# Patient Record
Sex: MALE | Race: WHITE
[De-identification: names, ages, dates, MRNs, and addresses within clinical notes are randomized per-mention and may not be internally consistent; named-entity substitution may affect disease eponyms.]

---

## 2024-08-30 ENCOUNTER — TELEPHONE (OUTPATIENT)
Facility: HOSPITAL | Age: 84
End: 2024-08-30

## 2024-09-03 ENCOUNTER — HOSPITAL ENCOUNTER (OUTPATIENT)
Facility: HOSPITAL | Age: 84
Setting detail: RECURRING SERIES
Discharge: HOME | End: 2024-09-06
Payer: MEDICARE

## 2024-09-03 PROCEDURE — 97161 PT EVAL LOW COMPLEX 20 MIN: CPT

## 2024-09-03 NOTE — PROGRESS NOTES
PT DAILY TREATMENT NOTE/SHOULDER EVAL 10-18    Patient Name: Preston Nicole  Date:9/3/2024  : 1940  [x]  Patient  Verified  Payor: HU[employer]NA MEDICARE / Plan: HU[employer]NA GOLD PLUS HMO / Product Type: *No Product type* /    In time:1132  Out time:1212  Visit #: 1  16      Treatment Area: Pain in left shoulder [M25.512]    SUBJECTIVE  Pain Level (0-10 scale): 1-6  []constant [x]intermittent []improving []worsening []no change since onset    Any medication changes, allergies to medications, adverse drug reactions, diagnosis change, or new procedure performed?: [x] No    [] Yes (see summary sheet for update)  Subjective functional status/changes:     Patient has c/c of left shoulder pain since lifting a heavy backpack onto left shoulder on or about 3/1/2024 and feeling a painful pop left shoulder. Pain improved temporarily with steroid injections but is now referred to PT due to recurrence of symptoms with attempts to return to normal ADLs.  Patient describes pain as aching in deltoid region with intermittent sharp pain. Pain is worse in PM. Denies numbness/tingling. Denies popping/clicking. Aggravating factors: lifting above shoulder level, dressing upper body, shopping, laundry. Alleviating factors: rest in supported positions.  Denies red flags: SOB, chest pain, dizziness/lightheadedness, blurred/double vision, HA, chills/fevers, night sweats, change in bowel/bladder control, abdominal pain, difficulty swallowing, slurred speech, unexplained weight gain/loss, nausea, vomiting. PMHx: unremarkable. Surgical Hx: none. Social Hx: , two level home, social alcohol, no tobacco, retired. PLOF: avid swimmer, yoga, gym 2-3x/wk. CLOF: unable to swim or lift weights in gym, moderate difficulty with self care and household ADLs.  Diagnostic Imaging: probable left shoulder infraspinatus small tear, subacromial bursitis and atrophy of infraspinatus and teres. Recent falls Hx: 
[id number]  Phone: [phone]   Fax: [phone]

## 2024-09-04 ENCOUNTER — TELEPHONE (OUTPATIENT)
Facility: HOSPITAL | Age: 84
End: 2024-09-04

## 2024-09-04 NOTE — TELEPHONE ENCOUNTER
Called patient to offer earlier appts due to auth approval. Patient stated he is fine waiting until 23rd to begin therapy.

## 2024-09-23 ENCOUNTER — HOSPITAL ENCOUNTER (OUTPATIENT)
Facility: HOSPITAL | Age: 84
Setting detail: RECURRING SERIES
Discharge: HOME | End: 2024-09-26
Payer: MEDICARE

## 2024-09-23 PROCEDURE — 97112 NEUROMUSCULAR REEDUCATION: CPT

## 2024-09-23 PROCEDURE — 97110 THERAPEUTIC EXERCISES: CPT

## 2024-09-23 PROCEDURE — 97016 VASOPNEUMATIC DEVICE THERAPY: CPT

## 2024-09-23 PROCEDURE — 97530 THERAPEUTIC ACTIVITIES: CPT

## 2024-09-25 ENCOUNTER — HOSPITAL ENCOUNTER (OUTPATIENT)
Facility: HOSPITAL | Age: 84
Setting detail: RECURRING SERIES
Discharge: HOME | End: 2024-09-28
Payer: MEDICARE

## 2024-09-25 PROCEDURE — 97140 MANUAL THERAPY 1/> REGIONS: CPT

## 2024-09-25 PROCEDURE — 97110 THERAPEUTIC EXERCISES: CPT

## 2024-09-25 PROCEDURE — 97112 NEUROMUSCULAR REEDUCATION: CPT

## 2024-10-02 ENCOUNTER — HOSPITAL ENCOUNTER (OUTPATIENT)
Facility: HOSPITAL | Age: 84
Setting detail: RECURRING SERIES
Discharge: HOME | End: 2024-10-05
Payer: MEDICARE

## 2024-10-02 PROCEDURE — 97016 VASOPNEUMATIC DEVICE THERAPY: CPT

## 2024-10-02 PROCEDURE — 97530 THERAPEUTIC ACTIVITIES: CPT

## 2024-10-02 PROCEDURE — 97110 THERAPEUTIC EXERCISES: CPT

## 2024-10-02 PROCEDURE — 97112 NEUROMUSCULAR REEDUCATION: CPT

## 2024-10-02 NOTE — PROGRESS NOTES
[facility]  [address] [address]                                                                                      Phone: [phone]   Fax: [phone]    Progress Note  Patient name: Preston Nicole Start of Care: 9/3/2024   Referral source: Daniel Recio MD : 1940   Medical/Treatment Diagnosis: Pain in left shoulder [M25.512] Onset Date:3/1/2024     Prior Hospitalization: see medical history Provider#: [id number]   Medications: Verified on Patient Summary List    Comorbidities: None   Prior Level of Function:avid swimmer, yoga, gym 2-3x/wk     Certification Period: 9/3/2024 to 2024     Visits from Start of Care: 4    Missed Visits: 0    Updated Goals/Measure of Progress:     Short Term Goals: To be accomplished in 4 weeks:                 Patient will report compliance with initial/basic HEP at least 1x/day to aid in rehabilitation program.                 Status at IE: Patient instructed in and provided written copy of initial Home Exercise Program.                 Current: Pt reports daily HEP compliance MET 10/2/24                    Patient will display full left shoulder pain free AROM into flex 135, abd 140, ER 75 to aid in completion of ADLs.                 Status at IE: left shoulder flex 68, abd 42, ER 34                 Current: left 150 void of pain, abduction 80, ER 60 (up to 4/10 pain for Abduction/ER) Progressing 10/2/24     Long Term Goals: To be accomplished in 8 weeks:                 Patient will report compliance with comprehensive HEP a least 3-4x/week to aid in rehabilitation/strengthening program.                 Status at IE: Patient instructed in and provided written copy of initial Home Exercise Program.                 Current: Pt reports daily HEP compliance MET 10/2/24                    Patient will report no pain greater than 1-2/10 with overhead activities to aid in completion of ADLs.                 Status at 
significant improvements with left shoulder AROM and made positive progressions in left shoulder functional strength, however still not WFL. Pt also reports pain up to 4/10 when performing shoulder abd/ER MMT's. Pt is making good progress towards goals in POC and will benefit from skilled PT services to improve pain-free mobility to address remaining unmet goals.    Patient will continue to benefit from skilled PT / OT services to modify and progress therapeutic interventions, analyze and address functional mobility deficits, analyze and address ROM deficits, analyze and address strength deficits, analyze and address soft tissue restrictions, analyze and cue for proper movement patterns, analyze and modify for postural abnormalities, and analyze and address imbalance/dizziness to address functional deficits and attain remaining goals.    Progress toward goals / Updated goals:  []  See Progress Note/Recertification    Short Term Goals: To be accomplished in 4 weeks:                 Patient will report compliance with initial/basic HEP at least 1x/day to aid in rehabilitation program.                 Status at IE: Patient instructed in and provided written copy of initial Home Exercise Program.                 Current: Pt reports daily HEP compliance MET 10/2/24                    Patient will display full left shoulder pain free AROM into flex 135, abd 140, ER 75 to aid in completion of ADLs.                 Status at IE: left shoulder flex 68, abd 42, ER 34                 Current: left 150 void of pain, abduction 80, ER 60 (up to 4/10 pain for Abduction/ER) Progressing 10/2/24     Long Term Goals: To be accomplished in 8 weeks:                 Patient will report compliance with comprehensive HEP a least 3-4x/week to aid in rehabilitation/strengthening program.                 Status at IE: Patient instructed in and provided written copy of initial Home Exercise Program.                 Current: Pt reports daily

## 2024-10-04 ENCOUNTER — HOSPITAL ENCOUNTER (OUTPATIENT)
Facility: HOSPITAL | Age: 84
Setting detail: RECURRING SERIES
Discharge: HOME | End: 2024-10-07
Payer: MEDICARE

## 2024-10-04 PROCEDURE — 97112 NEUROMUSCULAR REEDUCATION: CPT

## 2024-10-04 PROCEDURE — 97110 THERAPEUTIC EXERCISES: CPT

## 2024-10-04 PROCEDURE — 97530 THERAPEUTIC ACTIVITIES: CPT

## 2024-10-04 PROCEDURE — 97016 VASOPNEUMATIC DEVICE THERAPY: CPT

## 2024-10-04 NOTE — PROGRESS NOTES
PHYSICAL / OCCUPATIONAL THERAPY - DAILY TREATMENT NOTE (updated )    Patient Name: Preston Nicole    Date: 10/4/2024    : 1940  Insurance: Payor: HUMANA MEDICARE / Plan: HU[employer]NA GOLD PLUS HMO / Product Type: *No Product type* /      Patient  verified Yes     Visit #   Current / Total 5 16   Time   In / Out 1330 1426   Pain   In / Out 0-1 0   Subjective Functional Status/Changes: \"I have no pain at rest or with light use, but still hurts if I try to use any weight with it.\"   Changes to:  Meds, Allergies, Med Hx, Sx Hx?  If yes, update Summary List no       TREATMENT AREA =  Pain in left shoulder [M25.512]    If an interpreting service is utilized for treatment of this patient, the contents of this document represent the material reviewed with the patient via the .     OBJECTIVE  Modalities Rationale:     decrease edema and decrease pain to improve patient's ability to Complete ADLS  10 min [x]  Vasopneumatic Device, press/temp: Medium, low      If using vaso (need to measure limb vaso being performed on)      pre-treatment girth : 42.4 cm.      post-treatment girth : 42.0 cm.      measured at (landmark location): AC joint      min []  Other:    [x] Skin assessment post-treatment (if applicable):    [x]  intact    []  redness- no adverse reaction     []redness - adverse reaction:        Therapeutic Procedures:  Tx Min Billable or 1:1 Min (if diff from Tx Min) Procedure, Rationale, Specifics    69520 Therapeutic Exercise (timed):  increase ROM, strength, coordination, balance, and proprioception to improve patient's ability to progress to PLOF and address remaining functional goals. (see flow sheet as applicable)     Details if applicable:       86 [id number] Therapeutic Activity (timed):  use of dynamic activities replicating functional movements to increase ROM, strength, coordination, balance, and proprioception in order to improve patient's ability to progress to PLOF and address remaining

## 2024-10-09 ENCOUNTER — HOSPITAL ENCOUNTER (OUTPATIENT)
Facility: HOSPITAL | Age: 84
Setting detail: RECURRING SERIES
Discharge: HOME | End: 2024-10-12
Payer: MEDICARE

## 2024-10-09 NOTE — PROGRESS NOTES
PHYSICAL / OCCUPATIONAL THERAPY - DAILY TREATMENT NOTE     Patient Name: Preston Nicole    Date: 10/9/2024    : 1940  Insurance: Payor: HU[employer]NA MEDICARE / Plan: HU[employer]NA GOLD PLUS HMO / Product Type: *No Product type* /      Patient  verified Yes     Visit #   Current / Total 6 16   Time   In / Out 13:29 14:23   Pain   In / Out 3 1-2   Subjective Functional Status/Changes: I did a lot of driving yesterday and my arm is still hurting some   Changes to:  Allergies, Med Hx, Sx Hx?   no       TREATMENT AREA =  Pain in left shoulder [M25.512]    If an interpreting service is utilized for treatment of this patient, the contents of this document represent the material reviewed with the patient via the .     OBJECTIVE    Modalities Rationale:     decrease edema and decrease pain to improve patient's ability to progress to PLOF and address remaining functional goals.     min [] Estim Unattended, type/location:                                      []  w/ice    []  w/heat    min [] Estim Attended, type/location:                                     []  w/US     []  w/ice    []  w/heat    []  TENS insruct      min []  Mechanical Traction: type/lbs                   []  pro   []  sup   []  int   []  cont    []  before manual    []  after manual    min []  Ultrasound, settings/location:      min []  Iontophoresis w/ dexamethasone, location:                                               []  take home patch       []  in clinic        min  unbilled []  Ice     []  Heat    location/position:     min []  Paraffin,  details:    10 min [x]  Vasopneumatic Device, press/temp: Medium, low    min []  Whirlpool / Fluido:    If using vaso (only need to measure limb vaso being performed on)      pre-treatment girth : 42.5 cm      post-treatment girth : 42.1 cm      measured at (landmark location) :  AC joint    min []  Other:    Skin assessment post-treatment (if applicable):    [x]  intact    []  redness- no adverse reaction

## 2024-10-11 ENCOUNTER — HOSPITAL ENCOUNTER (OUTPATIENT)
Facility: HOSPITAL | Age: 84
Setting detail: RECURRING SERIES
Discharge: HOME | End: 2024-10-14
Payer: MEDICARE

## 2024-10-11 PROCEDURE — 97112 NEUROMUSCULAR REEDUCATION: CPT

## 2024-10-11 PROCEDURE — 97110 THERAPEUTIC EXERCISES: CPT

## 2024-10-11 PROCEDURE — 97530 THERAPEUTIC ACTIVITIES: CPT

## 2024-10-11 NOTE — PROGRESS NOTES
PHYSICAL / OCCUPATIONAL THERAPY - DAILY TREATMENT NOTE (updated )    Patient Name: Preston Nicole    Date: 10/11/2024    : 1940  Insurance: Payor: HUMANA MEDICARE / Plan: HU[employer]NA GOLD PLUS HMO / Product Type: *No Product type* /      Patient  verified Yes     Visit #   Current / Total 7 16   Time   In / Out 1330 1424   Pain   In / Out 4 3   Subjective Functional Status/Changes: \"The left shoulder is still aggravated from the long distance driving I did three days ago. It is pretty sore today.\"   Changes to:  Meds, Allergies, Med Hx, Sx Hx?  If yes, update Summary List no       TREATMENT AREA =  Pain in left shoulder [M25.512]    If an interpreting service is utilized for treatment of this patient, the contents of this document represent the material reviewed with the patient via the .     OBJECTIVE  Modalities Rationale:     decrease edema and decrease pain to improve patient's ability to Complete ADLS         10 min [x]  Vasopneumatic Device, press/temp: Medium, low         If using vaso (need to measure limb vaso being performed on)      pre-treatment girth : 42.4 cm.      post-treatment girth : 42.0 cm.      measured at (landmark location): AC joint       min []  Other:     [x] Skin assessment post-treatment (if applicable):    [x]  intact    []  redness- no adverse reaction     []redness - adverse reaction:      Therapeutic Procedures:  Tx Min Billable or 1:1 Min (if diff from Tx Min) Procedure, Rationale, Specifics   89 [id number] Therapeutic Exercise (timed):  increase ROM, strength, coordination, balance, and proprioception to improve patient's ability to progress to PLOF and address remaining functional goals. (see flow sheet as applicable)     Details if applicable:       52 15291 Therapeutic Activity (timed):  use of dynamic activities replicating functional movements to increase ROM, strength, coordination, balance, and proprioception in order to improve patient's ability to progress

## 2024-10-16 ENCOUNTER — HOSPITAL ENCOUNTER (OUTPATIENT)
Facility: HOSPITAL | Age: 84
Setting detail: RECURRING SERIES
Discharge: HOME | End: 2024-10-19
Payer: MEDICARE

## 2024-10-16 PROCEDURE — 97110 THERAPEUTIC EXERCISES: CPT

## 2024-10-16 PROCEDURE — 97530 THERAPEUTIC ACTIVITIES: CPT

## 2024-10-16 PROCEDURE — 97112 NEUROMUSCULAR REEDUCATION: CPT

## 2024-10-16 NOTE — PROGRESS NOTES
Exercise Program.                 Current: Pt instructed in shoulder isometrics and provided written copy of additions to HEP    Progressing, 10/11/24                    Patient will display full left shoulder pain free AROM into flex 135, abd 140, ER 75 to aid in completion of ADLs.                 Status at IE: left shoulder flex 68, abd 42, ER 34                 Current: left 150 void of pain, abduction 80, ER 60 (up to 4/10 pain for Abduction/ER) Progressing 10/2/24      Long Term Goals: To be accomplished in 8 weeks:                 Patient will report compliance with comprehensive HEP a least 3-4x/week to aid in rehabilitation/strengthening program.                 Status at IE: Patient instructed in and provided written copy of initial Home Exercise Program.                 Current: Pt reports daily HEP compliance 10/16/24                     Patient will report no pain greater than 1-2/10 with overhead activities to aid in completion of ADLs.                 Status at IE: 1-6/10                 Current: 1-4/10 pain at worst Progressing 10/2/24                    Patient will increase left shoulder strength to 5/5 throughout all planes to aid in completion of ADLs.                 Status at IE: left shoulder flex 3-, abd 3-, ER 3-                 Current: Increased resistance with UE strengthening interventions to progress towards functional strength goals 10/9/24                    Patient will increase QuickDASH: Disability Arm, Shoulder, Hand score by MCID of 19% to demonstrate improvement in functional status.                  Status at IE: 54.5%                 Current: 50% Progressing 10/2/24    PLAN  Yes  Continue plan of care  []  Upgrade activities as tolerated  []  Discharge due to :  []  Other:    Sergio Vora PTA    10/16/2024    7:51 AM    Future Appointments   Date Time Provider Department Center   10/16/2024  1:30 PM Sergio Vora PTA [facility]   10/18/2024  1:30 PM Kory Sotelo PT [facility]

## 2024-10-18 ENCOUNTER — HOSPITAL ENCOUNTER (OUTPATIENT)
Facility: HOSPITAL | Age: 84
Setting detail: RECURRING SERIES
Discharge: HOME | End: 2024-10-21
Payer: MEDICARE

## 2024-10-18 PROCEDURE — 97110 THERAPEUTIC EXERCISES: CPT

## 2024-10-18 PROCEDURE — 97112 NEUROMUSCULAR REEDUCATION: CPT

## 2024-10-18 PROCEDURE — 97530 THERAPEUTIC ACTIVITIES: CPT

## 2024-10-18 PROCEDURE — 97016 VASOPNEUMATIC DEVICE THERAPY: CPT

## 2024-10-18 NOTE — PROGRESS NOTES
PHYSICAL / OCCUPATIONAL THERAPY - DAILY TREATMENT NOTE (updated )    Patient Name: Preston Nicole    Date: 10/18/2024    : 1940  Insurance: Payor: HUMANA MEDICARE / Plan: HU[employer]NA GOLD PLUS HMO / Product Type: *No Product type* /      Patient  verified Yes     Visit #   Current / Total 9 16   Time   In / Out 1330 1434   Pain   In / Out 6 4   Subjective Functional Status/Changes: \"I woke up this morning with real bad pain in the left shoulder. I didn't do anything different, so I am not sure why it is hurting so bad.\"   Changes to:  Meds, Allergies, Med Hx, Sx Hx?  If yes, update Summary List no       TREATMENT AREA =  Pain in left shoulder [M25.512]    If an interpreting service is utilized for treatment of this patient, the contents of this document represent the material reviewed with the patient via the .     OBJECTIVE  Modalities Rationale:     decrease edema and decrease pain to improve patient's ability to Complete ADLS         15 min [x]  Vasopneumatic Device, press/temp: Medium, low         If using vaso (need to measure limb vaso being performed on)      pre-treatment girth : 42.6 cm.      post-treatment girth : 42.1 cm.      measured at (landmark location): AC joint       min []  Other:     [x] Skin assessment post-treatment (if applicable):    [x]  intact    []  redness- no adverse reaction     []redness - adverse reaction:      Therapeutic Procedures:  Tx Min Billable or 1:1 Min (if diff from Tx Min) Procedure, Rationale, Specifics   21 15 [id number] Therapeutic Exercise (timed):  increase ROM, strength, coordination, balance, and proprioception to improve patient's ability to progress to PLOF and address remaining functional goals. (see flow sheet as applicable)     Details if applicable:       58 [id number] Therapeutic Activity (timed):  use of dynamic activities replicating functional movements to increase ROM, strength, coordination, balance, and proprioception in order to improve

## 2024-10-23 ENCOUNTER — HOSPITAL ENCOUNTER (OUTPATIENT)
Facility: HOSPITAL | Age: 84
Setting detail: RECURRING SERIES
Discharge: HOME | End: 2024-10-26
Payer: MEDICARE

## 2024-10-23 PROCEDURE — 97016 VASOPNEUMATIC DEVICE THERAPY: CPT

## 2024-10-23 PROCEDURE — 97110 THERAPEUTIC EXERCISES: CPT

## 2024-10-23 PROCEDURE — 97530 THERAPEUTIC ACTIVITIES: CPT

## 2024-10-23 PROCEDURE — 97112 NEUROMUSCULAR REEDUCATION: CPT

## 2024-10-23 NOTE — PROGRESS NOTES
PHYSICAL / OCCUPATIONAL THERAPY - DAILY TREATMENT NOTE     Patient Name: Preston Nicole    Date: 10/23/2024    : 1940  Insurance: Payor: HUMANA MEDICARE / Plan: HUMANA GOLD PLUS HMO / Product Type: *No Product type* /      Patient  verified Yes     Visit #   Current / Total 10 16   Time   In / Out 1:28 2:26   Pain   In / Out 4 3   Subjective Functional Status/Changes: Pt states that his pain levels are back to normal today   Changes to:  Allergies, Med Hx, Sx Hx?   no       TREATMENT AREA =  Pain in left shoulder [M25.512]    If an interpreting service is utilized for treatment of this patient, the contents of this document represent the material reviewed with the patient via the .     OBJECTIVE    Modalities Rationale:     decrease edema and decrease pain to improve patient's ability to progress to PLOF and address remaining functional goals.     min [] Estim Unattended, type/location:                                      []  w/ice    []  w/heat    min [] Estim Attended, type/location:                                     []  w/US     []  w/ice    []  w/heat    []  TENS insruct      min []  Mechanical Traction: type/lbs                   []  pro   []  sup   []  int   []  cont    []  before manual    []  after manual    min []  Ultrasound, settings/location:      min []  Iontophoresis w/ dexamethasone, location:                                               []  take home patch       []  in clinic        min  unbilled []  Ice     []  Heat    location/position:     min []  Paraffin,  details:    15 min []  Vasopneumatic Device, press/temp: Medium/34 degrees    min []  Whirlpool / Fluido:    If using vaso (only need to measure limb vaso being performed on)      pre-treatment girth : 45 cm      post-treatment girth : 44.6 cm      measured at (landmark location) :  AC joint    min []  Other:    Skin assessment post-treatment (if applicable):    [x]  intact    []  redness- no adverse reaction

## 2024-10-25 ENCOUNTER — HOSPITAL ENCOUNTER (OUTPATIENT)
Facility: HOSPITAL | Age: 84
Setting detail: RECURRING SERIES
Discharge: HOME | End: 2024-10-28
Payer: MEDICARE

## 2024-10-25 PROCEDURE — 97112 NEUROMUSCULAR REEDUCATION: CPT

## 2024-10-25 PROCEDURE — 97530 THERAPEUTIC ACTIVITIES: CPT

## 2024-10-25 PROCEDURE — 97110 THERAPEUTIC EXERCISES: CPT

## 2024-10-25 NOTE — PROGRESS NOTES
PHYSICAL / OCCUPATIONAL THERAPY - DAILY TREATMENT NOTE (updated )    Patient Name: Preston Nicole    Date: 10/25/2024    : 1940  Insurance: Payor: HUMANA MEDICARE / Plan: HU[employer]NA GOLD PLUS HMO / Product Type: *No Product type* /      Patient  verified Yes     Visit #   Current / Total 11 16   Time   In / Out 1329 1409   Pain   In / Out 4 4   Subjective Functional Status/Changes: \"The shoulder just seems to be at a new norm of being kind of sore all the time.\"   Changes to:  Meds, Allergies, Med Hx, Sx Hx?  If yes, update Summary List no       TREATMENT AREA =  Pain in left shoulder [M25.512]    If an interpreting service is utilized for treatment of this patient, the contents of this document represent the material reviewed with the patient via the .     OBJECTIVE    Therapeutic Procedures:  Tx Min Billable or 1:1 Min (if diff from Tx Min) Procedure, Rationale, Specifics   15  39809 Therapeutic Exercise (timed):  increase ROM, strength, coordination, balance, and proprioception to improve patient's ability to progress to PLOF and address remaining functional goals. (see flow sheet as applicable)     Details if applicable:       15  47307 Therapeutic Activity (timed):  use of dynamic activities replicating functional movements to increase ROM, strength, coordination, balance, and proprioception in order to improve patient's ability to progress to PLOF and address remaining functional goals.  (see flow sheet as applicable)     Details if applicable:     10  54439 Neuromuscular Re-Education (timed):  improve balance, coordination, kinesthetic sense, posture, core stability and proprioception to improve patient's ability to develop conscious control of individual muscles and awareness of position of extremities in order to progress to PLOF and address remaining functional goals. (see flow sheet as applicable)     Details if applicable:     40  MC BC Totals Reminder: bill using total billable min

## 2024-10-25 NOTE — PROGRESS NOTES
[facility]  [address] [address]  Phone: [phone]   Fax: [phone]    Discharge Summary    Patient name: Preston Nicole Start of Care: 9/3/2024   Referral source: Daniel Recio MD : 1940   Medical/Treatment Diagnosis: Pain in left shoulder [M25.512] Onset Date:3/1/2024      Prior Hospitalization: see medical history Provider#: [id number]   Medications: Verified on Patient Summary List     Comorbidities: None   Prior Level of Function:avid swimmer, yoga, gym 2-3x/wk      Certification Period: 9/3/2024 to 2024      Visits from Start of Care: 11                                     Missed Visits: 1    Goals/Measure of Progress:  Short Term Goals: To be accomplished in 4 weeks:                 Patient will report compliance with initial/basic HEP at least 1x/day to aid in rehabilitation program.                 Status at IE: Patient instructed in and provided written copy of initial Home Exercise Program.                 Current: Pt instructed in shoulder isometrics and provided written copy of additions to HEP    Progressing, 10/11/24                    Patient will display full left shoulder pain free AROM into flex 135, abd 140, ER 75 to aid in completion of ADLs.                 Status at IE: left shoulder flex 68, abd 42, ER 34                 Current: left 150 void of pain, abduction 80, ER 60 (up to 4/10 pain for Abduction/ER) Progressing 10/2/24               Current: left shoulder abduction 70 degrees, ER 60 degrees with pain noted at end range. 10/23/24 slight regression.     Long Term Goals: To be accomplished in 8 weeks:                 Patient will report compliance with comprehensive HEP a least 3-4x/week to aid in rehabilitation/strengthening program.                 Status at IE: Patient instructed in and provided written copy of initial Home Exercise Program.                 Current: Pt reports continued daily compliance to HEP

## 2024-10-25 NOTE — PROGRESS NOTES
Physical Therapy Discharge Instructions    [facility]  [address] [address]  Phone: [phone]   Fax: [phone]      Patient: Preston Nicole  : 1940    Continue Home Exercise Program 2 times per day for 6 weeks, then decrease to 4-5 times per week      Continue with    [x] Ice  as needed      [] Heat           Follow up with MD:     [x] Upon completion of therapy     [] As needed      Recommendations:     []   Return to activity with home program    [x]   Return to activity with the following modifications:       []Post Rehab Program    []Join Independent aquatic program     [x]Return to/join local gym      Additional Comments: Please contact clinic at above phone number if you have any questions regarding Home Exercise Program or self care instructions.

## 2024-10-30 ENCOUNTER — TELEPHONE (OUTPATIENT)
Facility: HOSPITAL | Age: 84
End: 2024-10-30

## 2024-10-30 NOTE — TELEPHONE ENCOUNTER
Per patient, he had a follow-up with Ortho. He will be having rotator cuff surgery in 3wks and wanted to update the therapist.

## 2024-11-04 ENCOUNTER — HOSPITAL ENCOUNTER (OUTPATIENT)
Facility: HOSPITAL | Age: 84
Discharge: HOME | End: 2024-11-06

## 2024-11-04 ENCOUNTER — HOSPITAL ENCOUNTER (OUTPATIENT)
Facility: HOSPITAL | Age: 84
Discharge: HOME | End: 2024-11-07

## 2024-11-04 ENCOUNTER — TRANSCRIBE ORDERS (OUTPATIENT)
Facility: HOSPITAL | Age: 84
End: 2024-11-04

## 2024-11-04 ENCOUNTER — HOSPITAL ENCOUNTER (OUTPATIENT)
Facility: HOSPITAL | Age: 84
Discharge: HOME | End: 2024-11-07
Payer: MEDICARE

## 2024-11-04 DIAGNOSIS — M75.42 IMPINGEMENT SYNDROME OF LEFT SHOULDER: ICD-10-CM

## 2024-11-04 DIAGNOSIS — Z01.812 BLOOD TESTS PRIOR TO TREATMENT OR PROCEDURE: ICD-10-CM

## 2024-11-04 DIAGNOSIS — M75.102 TEAR OF LEFT ROTATOR CUFF, UNSPECIFIED TEAR EXTENT, UNSPECIFIED WHETHER TRAUMATIC: ICD-10-CM

## 2024-11-04 DIAGNOSIS — M75.102 TEAR OF LEFT ROTATOR CUFF, UNSPECIFIED TEAR EXTENT, UNSPECIFIED WHETHER TRAUMATIC: Primary | ICD-10-CM

## 2024-11-04 LAB
ALBUMIN SERPL-MCNC: 3.7 G/DL (ref 3.4–5)
ALBUMIN/GLOB SERPL: 1.2 (ref 0.8–1.7)
ALP SERPL-CCNC: 71 U/L (ref 45–117)
ALT SERPL-CCNC: 21 U/L (ref 16–61)
ANION GAP SERPL CALC-SCNC: 3 MMOL/L (ref 3–18)
AST SERPL-CCNC: 16 U/L (ref 10–38)
BASOPHILS # BLD: 0 K/UL (ref 0–0.1)
BASOPHILS NFR BLD: 0 % (ref 0–2)
BILIRUB SERPL-MCNC: 0.6 MG/DL (ref 0.2–1)
BUN SERPL-MCNC: 26 MG/DL (ref 7–18)
BUN/CREAT SERPL: 21 (ref 12–20)
CALCIUM SERPL-MCNC: 9.3 MG/DL (ref 8.5–10.1)
CHLORIDE SERPL-SCNC: 106 MMOL/L (ref 100–111)
CO2 SERPL-SCNC: 31 MMOL/L (ref 21–32)
CREAT SERPL-MCNC: 1.25 MG/DL (ref 0.6–1.3)
DIFFERENTIAL METHOD BLD: ABNORMAL
EOSINOPHIL # BLD: 0.3 K/UL (ref 0–0.4)
EOSINOPHIL NFR BLD: 4 % (ref 0–5)
ERYTHROCYTE [DISTWIDTH] IN BLOOD BY AUTOMATED COUNT: 12.4 % (ref 11.6–14.5)
FAX TO NUMBER: NORMAL
GLOBULIN SER CALC-MCNC: 3 G/DL (ref 2–4)
GLUCOSE SERPL-MCNC: 100 MG/DL (ref 74–99)
HCT VFR BLD AUTO: 44.5 % (ref 36–48)
HGB BLD-MCNC: 14.5 G/DL (ref 13–16)
IMM GRANULOCYTES # BLD AUTO: 0 K/UL (ref 0–0.04)
IMM GRANULOCYTES NFR BLD AUTO: 0 % (ref 0–0.5)
LYMPHOCYTES # BLD: 1.2 K/UL (ref 0.9–3.6)
LYMPHOCYTES NFR BLD: 16 % (ref 21–52)
MCH RBC QN AUTO: 32.3 PG (ref 24–34)
MCHC RBC AUTO-ENTMCNC: 32.6 G/DL (ref 31–37)
MCV RBC AUTO: 99.1 FL (ref 78–100)
MONOCYTES # BLD: 0.8 K/UL (ref 0.05–1.2)
MONOCYTES NFR BLD: 11 % (ref 3–10)
NEUTS SEG # BLD: 5.1 K/UL (ref 1.8–8)
NEUTS SEG NFR BLD: 69 % (ref 40–73)
NRBC # BLD: 0 K/UL (ref 0–0.01)
NRBC BLD-RTO: 0 PER 100 WBC
PLATELET # BLD AUTO: 294 K/UL (ref 135–420)
PMV BLD AUTO: 9 FL (ref 9.2–11.8)
POTASSIUM SERPL-SCNC: 5.2 MMOL/L (ref 3.5–5.5)
PROT SERPL-MCNC: 6.7 G/DL (ref 6.4–8.2)
RBC # BLD AUTO: 4.49 M/UL (ref 4.35–5.65)
SODIUM SERPL-SCNC: 140 MMOL/L (ref 136–145)
TEST RESULTS FAXED TO: NORMAL
WBC # BLD AUTO: 7.4 K/UL (ref 4.6–13.2)

## 2024-11-04 PROCEDURE — 85025 COMPLETE CBC W/AUTO DIFF WBC: CPT

## 2024-11-04 PROCEDURE — 36415 COLL VENOUS BLD VENIPUNCTURE: CPT

## 2024-11-04 PROCEDURE — 93005 ELECTROCARDIOGRAM TRACING: CPT

## 2024-11-04 PROCEDURE — 80053 COMPREHEN METABOLIC PANEL: CPT

## 2024-11-05 LAB
EKG ATRIAL RATE: 59 BPM
EKG DIAGNOSIS: NORMAL
EKG P AXIS: 17 DEGREES
EKG P-R INTERVAL: 194 MS
EKG Q-T INTERVAL: 402 MS
EKG QRS DURATION: 104 MS
EKG QTC CALCULATION (BAZETT): 397 MS
EKG R AXIS: -45 DEGREES
EKG T AXIS: 54 DEGREES
EKG VENTRICULAR RATE: 59 BPM

## 2025-01-17 ENCOUNTER — HOSPITAL ENCOUNTER (OUTPATIENT)
Facility: HOSPITAL | Age: 85
Setting detail: RECURRING SERIES
Discharge: HOME | End: 2025-01-20
Payer: MEDICARE

## 2025-01-17 PROCEDURE — 97162 PT EVAL MOD COMPLEX 30 MIN: CPT

## 2025-01-17 NOTE — PROGRESS NOTES
PT DAILY TREATMENT NOTE/SHOULDER EVAL     Patient Name: Preston Nicole    Date: 2025    : 1940  Insurance: Payor: HU[employer]NA MEDICARE / Plan: HU[employer] GOLD PLUS HMO / Product Type: *No Product type* /      Patient  verified yes     Visit #   Current / Total 1 24   Time   In / Out 10:00am 10:38am   Pain   In / Out 0 0   Subjective Functional Status/Changes: See below       Treatment Area: Left shoulder pain [M25.512]    If an interpreting service is utilized for treatment of this patient, the contents of this document represent the material reviewed with the patient via the .       SUBJECTIVE  Pain Level (0-10 scale): see above  []constant [x]intermittent []improving []worsening []no change since onset    Any medication changes, allergies to medications, adverse drug reactions, diagnosis change, or new procedure performed?: [x] No    [] Yes (see summary sheet for update)  Subjective functional status/changes:     PLOF: functionally independent, no AD, exercise = yoga/hiking/swimming  Mechanism of Injury: 24 large rotor cuff tear surgery; first day at  without sling per MD   Current symptoms/Complaints:   - right handed  - denied radicular symptoms  - pain with reaching in all directions    PMHx/Surgical Hx: cataract surgery  Work Hx: retired   Living Situation: lives with wife  Pt Goals: return to PLOF and regular exercise routine   Barriers: []pain []financial []time []transportation []other  Substance use: []Alcohol []Tobacco []other:   FABQ Score: []low []elevate  Cognition: A & O x 4    Other:    OBJECTIVE/EXAMINATION    24 min [x]Eval                  []Re-Eval       Therapeutic Procedures:  Tx Min Billable or 1:1 Min (if diff from Tx Min) Procedure, Rationale, Specifics   7  09779 Therapeutic Exercise (timed):  increase ROM, strength, coordination, balance, and proprioception to improve patient's ability to progress to PLOF and address remaining functional goals. (see flow sheet

## 2025-01-17 NOTE — PROGRESS NOTES
[facility] at the [address], [address], [place] [address]   Phone: [phone]     Fax: [phone]       Plan of Care/ Statement of Necessity for Physical Therapy Services    Patient name: Preston Nicole Start of Care: 2025   Referral source: Ahsan Desir PA-C : 1940    Medical Diagnosis: Left shoulder pain [M25.512]       Onset Date:s/p 24    Treatment Diagnosis: M25.512  LEFT SHOULDER PAIN                            Prior Hospitalization: see medical history Provider#: [id number]     Comorbidities: cataract surgery     Prior Level of Function: functionally independent, no AD, exercise = yoga/hiking/swimming     If an interpreting service is utilized for treatment of this patient, the contents of this document represent the material reviewed with the patient via the .       The Plan of Care and following information is based on the information from the initial evaluation.  Assessment/ key information: Pt is a right handed 85 yo male presenting to therapy with c/c left shoulder pain s/p large RCR surgery on 24. He reports recent follow up with MD anni pean, who also D/C him from the slight starting at today's visit/IE. Pain increases to 8/10 with reaching left arm in any direction impacting ADLs/IADls (no AROM per this part of the protocol making this normal). Pain decreases to 0-1/10 with rest. Pt demonstrates limitations in ROM and functional strength per  dynamometer. Will progress conservatively per standard large RCR protocol. Pt would benefit from skilled PT services to address the above impairments to allow pt to complete ADLs with increased ease and less pain.       Evaluation Complexity:  History:  MEDIUM  Complexity : 1-2 comorbidities / personal factors will impact the outcome/ POC ; Examination:  MEDIUM Complexity : 3 Standardized tests and measures addressin body structure, function, activity limitation

## 2025-01-20 ENCOUNTER — HOSPITAL ENCOUNTER (OUTPATIENT)
Facility: HOSPITAL | Age: 85
Setting detail: RECURRING SERIES
Discharge: HOME | End: 2025-01-23
Payer: MEDICARE

## 2025-01-20 PROCEDURE — 97110 THERAPEUTIC EXERCISES: CPT

## 2025-01-20 PROCEDURE — 97140 MANUAL THERAPY 1/> REGIONS: CPT

## 2025-01-20 PROCEDURE — 97530 THERAPEUTIC ACTIVITIES: CPT

## 2025-01-20 PROCEDURE — 97016 VASOPNEUMATIC DEVICE THERAPY: CPT

## 2025-01-20 NOTE — PROGRESS NOTES
PHYSICAL / OCCUPATIONAL THERAPY - DAILY TREATMENT NOTE     Patient Name: Preston Nicole    Date: 2025    : 1940  Insurance: Payor: HU[employer]NA MEDICARE / Plan: HU[employer] GOLD PLUS HMO / Product Type: *No Product type* /      Patient  verified Yes     Visit #   Current / Total 2 24   Time   In / Out 10:40am 11:29   Pain   In / Out 3-4 0   Subjective Functional Status/Changes: Pt reports AAROM chest press caused left shoulder pain == pt deferred this HEP for 1-2 weeks    Changes to:  Allergies, Med Hx, Sx Hx?   no       TREATMENT AREA =  Left shoulder pain [M25.512]    If an interpreting service is utilized for treatment of this patient, the contents of this document represent the material reviewed with the patient via the .     OBJECTIVE    Modalities Rationale:     decrease edema, decrease inflammation, and decrease pain to improve patient's ability to progress to PLOF and address remaining functional goals.     min [] Estim Unattended, type/location:                                      []  w/ice    []  w/heat    min [] Estim Attended, type/location:                                     []  w/US     []  w/ice    []  w/heat    []  TENS insruct      min []  Mechanical Traction: type/lbs                   []  pro   []  sup   []  int   []  cont    []  before manual    []  after manual    min []  Ultrasound, settings/location:      min []  Iontophoresis w/ dexamethasone, location:                                               []  take home patch       []  in clinic     4   min  unbilled []  Ice     [x]  Heat    location/position: Prior to treatment - left UE/shoulder    min []  Paraffin,  details:    10 min [x]  Vasopneumatic Device, press/temp: Left shoulder - low compression     min []  Whirlpool / Fluido:    If using vaso (only need to measure limb vaso being performed on)      pre-treatment girth : 47cm       post-treatment girth : 47 cm       measured at (landmark location) :  mid axillary     min

## 2025-01-23 ENCOUNTER — HOSPITAL ENCOUNTER (OUTPATIENT)
Facility: HOSPITAL | Age: 85
Setting detail: RECURRING SERIES
Discharge: HOME | End: 2025-01-26
Payer: MEDICARE

## 2025-01-23 PROCEDURE — 97016 VASOPNEUMATIC DEVICE THERAPY: CPT

## 2025-01-23 PROCEDURE — 97140 MANUAL THERAPY 1/> REGIONS: CPT

## 2025-01-23 PROCEDURE — 97530 THERAPEUTIC ACTIVITIES: CPT

## 2025-01-23 PROCEDURE — 97110 THERAPEUTIC EXERCISES: CPT

## 2025-01-23 NOTE — PROGRESS NOTES
PHYSICAL / OCCUPATIONAL THERAPY - DAILY TREATMENT NOTE     Patient Name: Preston Nicole    Date: 2025    : 1940  Insurance: Payor: HUMANA MEDICARE / Plan: HU[employer]NA GOLD PLUS HMO / Product Type: *No Product type* /      Patient  verified Yes     Visit #   Current / Total 3 24   Time   In / Out 1438 1534   Pain   In / Out 2/10 <110   Subjective Functional Status/Changes: Patient states that he woke up this morning with a 2/10 pain in the left shoulder that has not gone away all day. It did not get any better or worse with completion of HEP this morning.    Changes to:  Allergies, Med Hx, Sx Hx?   no       TREATMENT AREA =  Left shoulder pain [M25.512]    If an interpreting service is utilized for treatment of this patient, the contents of this document represent the material reviewed with the patient via the .     OBJECTIVE    Modalities Rationale:     decrease pain and increase tissue extensibility to improve patient's ability to progress to PLOF and address remaining functional goals.     min [] Estim Unattended, type/location:                                      []  w/ice    []  w/heat    min [] Estim Attended, type/location:                                     []  w/US     []  w/ice    []  w/heat    []  TENS insruct      min []  Mechanical Traction: type/lbs                   []  pro   []  sup   []  int   []  cont    []  before manual    []  after manual    min []  Ultrasound, settings/location:      min []  Iontophoresis w/ dexamethasone, location:                                               []  take home patch       []  in clinic     5   min  unbilled []  Ice     [x]  Heat    location/position: Left shoulder cervical pack pre-workout    min []  Paraffin,  details:    10 min []  Vasopneumatic Device, press/temp: Low pressure, 34 deg F, left shoulder in seated    min []  Whirlpool / Fluido:    If using vaso (only need to measure limb vaso being performed on)      pre-treatment girth : 49

## 2025-01-28 ENCOUNTER — HOSPITAL ENCOUNTER (OUTPATIENT)
Facility: HOSPITAL | Age: 85
Setting detail: RECURRING SERIES
Discharge: HOME | End: 2025-01-31
Payer: MEDICARE

## 2025-01-28 PROCEDURE — 97140 MANUAL THERAPY 1/> REGIONS: CPT

## 2025-01-28 PROCEDURE — 97530 THERAPEUTIC ACTIVITIES: CPT

## 2025-01-28 PROCEDURE — 97110 THERAPEUTIC EXERCISES: CPT

## 2025-01-28 NOTE — PROGRESS NOTES
2.  Pt will increase left shoulder PROM to 160 deg flexion/ABD, and >60 deg ER/IR to improve mobility for ADLs.  Eval:                                           AROM                                                              PROM    Left Right   Left Right   Flexion  Flexion 92 165   Scaption/ABD  Scaptin/ABD 74 147   ER @ 0 Degrees NT NT ER @ 0 Degrees 20 NT   ER @ 90 Degrees NT 90 ER @ 90 Degrees NT 90   IR @ 90 Degrees NT 40 IR  Degrees @0 = 60 @ 90 = 45      Current (01/28/2025): abduction: 86 degrees PROM, flexion: 114 degrees PROM     Long Term Goals: LTG- To be accomplished in 12 week(s):  1.  Pt will report a worst pain of 3/10 to improve functional activity tolerance for a return to regular exercise program.  Eval:worst pain = 8/10      2.  Pt will increase left UE strength via  dynamometer by 5-10# to improve functional UE strength required for lifting/carrying items.  Eval: Strength : right = 83#, left = 70#      3.  Pt will increase left shoulder AROM to 160 deg flexion/ABD, and >60 deg ER/IR to improve mobility for IADLs.  Eval:                                           AROM                                                              PROM    Left Right   Left Right   Flexion  Flexion 92 165   Scaption/ABD  Scaptin/ABD 74 147   ER @ 0 Degrees NT NT ER @ 0 Degrees 20 NT   ER @ 90 Degrees NT 90 ER @ 90 Degrees NT 90   IR @ 90 Degrees NT 40 IR  Degrees @0 = 60 @ 90 = 45         4.  Pt Quick Dash score will improvement of 15 points to show improvement in functional activity tolerance and improve QOL.   Eval:Quick Dash = 36.4  Current: will address at visit 5    PLAN  Yes  Continue plan of care  []  Upgrade activities as tolerated  []  Discharge due to :  []  Other:    Teresa Camara PT    1/28/2025    1:37 PM    Future Appointments   Date Time Provider Department Center   1/31/2025  2:00 PM Teresa Camara PT [facility]   2/4/2025  8:40 AM Teresa Camara PT [facility]

## 2025-01-31 ENCOUNTER — HOSPITAL ENCOUNTER (OUTPATIENT)
Facility: HOSPITAL | Age: 85
Setting detail: RECURRING SERIES
End: 2025-01-31
Payer: MEDICARE

## 2025-01-31 PROCEDURE — 97110 THERAPEUTIC EXERCISES: CPT

## 2025-01-31 PROCEDURE — 97530 THERAPEUTIC ACTIVITIES: CPT

## 2025-01-31 PROCEDURE — 97140 MANUAL THERAPY 1/> REGIONS: CPT

## 2025-01-31 NOTE — PROGRESS NOTES
PHYSICAL / OCCUPATIONAL THERAPY - DAILY TREATMENT NOTE     Patient Name: Preston Nicole    Date: 2025    : 1940  Insurance: Payor: HU[employer]NA MEDICARE / Plan: HU[employer] GOLD PLUS HMO / Product Type: *No Product type* /      Patient  verified Yes     Visit #   Current / Total 5 24   Time   In / Out 1405 1445   Pain   In / Out 1/10 1/10   Subjective Functional Status/Changes: Patient reports that his shoulder is only minimally painful this afternoon.    Changes to:  Allergies, Med Hx, Sx Hx?   no       TREATMENT AREA =  Left shoulder pain [M25.512]    If an interpreting service is utilized for treatment of this patient, the contents of this document represent the material reviewed with the patient via the .     OBJECTIVE    Therapeutic Procedures:  Tx Min Billable or 1:1 Min (if diff from Tx Min) Procedure, Rationale, Specifics   20  58084 Therapeutic Exercise (timed):  increase ROM, strength, coordination, balance, and proprioception to improve patient's ability to progress to PLOF and address remaining functional goals. (see flow sheet as applicable)    Details if applicable:       12  50099 Therapeutic Activity (timed):  use of dynamic activities replicating functional movements to increase ROM, strength, coordination, balance, and proprioception in order to improve patient's ability to progress to PLOF and address remaining functional goals.  (see flow sheet as applicable)    Details if applicable:     8  65092 Manual Therapy (timed):  decrease pain, increase ROM, and increase tissue extensibility to improve patient's ability to progress to PLOF and address remaining functional goals.  The manual therapy interventions were performed at a separate and distinct time from the therapeutic activities interventions . Details: PROM of left shoulder into flexion, abduction, and ER in supine      Details if applicable:     40  [facility] BC Totals Reminder: bill using total billable min of TIMED therapeutic

## 2025-02-04 ENCOUNTER — HOSPITAL ENCOUNTER (OUTPATIENT)
Facility: HOSPITAL | Age: 85
Setting detail: RECURRING SERIES
Discharge: HOME | End: 2025-02-07
Payer: MEDICARE

## 2025-02-04 PROCEDURE — 97110 THERAPEUTIC EXERCISES: CPT

## 2025-02-04 PROCEDURE — 97140 MANUAL THERAPY 1/> REGIONS: CPT

## 2025-02-04 PROCEDURE — 97530 THERAPEUTIC ACTIVITIES: CPT

## 2025-02-04 PROCEDURE — 97016 VASOPNEUMATIC DEVICE THERAPY: CPT

## 2025-02-04 NOTE — PROGRESS NOTES
PHYSICAL / OCCUPATIONAL THERAPY - DAILY TREATMENT NOTE     Patient Name: Preston Nicole    Date: 2025    : 1940  Insurance: Payor: HUMANA MEDICARE / Plan: HU[employer]NA GOLD PLUS HMO / Product Type: *No Product type* /      Patient  verified Yes     Visit #   Current / Total 6 24   Time   In / Out 8:40am 9:33am   Pain   In / Out 1 1   Subjective Functional Status/Changes: The pt reports he is feeling well    Changes to:  Allergies, Med Hx, Sx Hx?   no       TREATMENT AREA =  Left shoulder pain [M25.512]    If an interpreting service is utilized for treatment of this patient, the contents of this document represent the material reviewed with the patient via the .     OBJECTIVE    Modalities Rationale:     decrease edema, decrease inflammation, and decrease pain to improve patient's ability to progress to PLOF and address remaining functional goals.     min [] Estim Unattended, type/location:                                      []  w/ice    []  w/heat    min [] Estim Attended, type/location:                                     []  w/US     []  w/ice    []  w/heat    []  TENS insruct      min []  Mechanical Traction: type/lbs                   []  pro   []  sup   []  int   []  cont    []  before manual    []  after manual    min []  Ultrasound, settings/location:      min []  Iontophoresis w/ dexamethasone, location:                                               []  take home patch       []  in clinic        min  unbilled []  Ice     []  Heat    location/position:     min []  Paraffin,  details:    10 min [x]  Vasopneumatic Device, press/temp: Seated - left shoulder ; low compression     min []  Whirlpool / Fluido:    If using vaso (only need to measure limb vaso being performed on)      pre-treatment girth : 46cm       post-treatment girth : 46cm       measured at (landmark location) :  mid axillary     min []  Other:    Skin assessment post-treatment (if applicable):    []  intact    []  redness- no

## 2025-02-07 ENCOUNTER — HOSPITAL ENCOUNTER (OUTPATIENT)
Facility: HOSPITAL | Age: 85
Setting detail: RECURRING SERIES
Discharge: HOME | End: 2025-02-10
Payer: MEDICARE

## 2025-02-07 PROCEDURE — 97140 MANUAL THERAPY 1/> REGIONS: CPT

## 2025-02-07 PROCEDURE — 97112 NEUROMUSCULAR REEDUCATION: CPT

## 2025-02-07 NOTE — PROGRESS NOTES
PHYSICAL / OCCUPATIONAL THERAPY - DAILY TREATMENT NOTE     Patient Name: Preston Nicole    Date: 2025    : 1940  Insurance: Payor: HUMANA MEDICARE / Plan: HU[employer]NA GOLD PLUS HMO / Product Type: *No Product type* /      Patient  verified Yes     Visit #   Current / Total 7 24   Time   In / Out 0923 1012   Pain   In / Out 1/10 1/10   Subjective Functional Status/Changes: Patient reports that he's feeling good this morning.    Changes to:  Allergies, Med Hx, Sx Hx?   no       TREATMENT AREA =  Left shoulder pain [M25.512]    If an interpreting service is utilized for treatment of this patient, the contents of this document represent the material reviewed with the patient via the .     OBJECTIVE    Therapeutic Procedures:  Tx Min Billable or 1:1 Min (if diff from Tx Min) Procedure, Rationale, Specifics   22 14 70182 Neuromuscular Re-Education (timed):  improve balance, coordination, kinesthetic sense, posture, core stability and proprioception to improve patient's ability to develop conscious control of individual muscles and awareness of position of extremities in order to progress to PLOF and address remaining functional goals. (see flow sheet as applicable)    Details if applicable:       11  90524 Manual Therapy (timed):  decrease pain, increase ROM, and increase tissue extensibility to improve patient's ability to progress to PLOF and address remaining functional goals.  The manual therapy interventions were performed at a separate and distinct time from the therapeutic activities interventions . Details: PROM of left shoulder into flexion, abduction, IR, and ER in supine    Details if applicable:     16 4 28376 Therapeutic Activity (timed):  use of dynamic activities replicating functional movements to increase ROM, strength, coordination, balance, and proprioception in order to improve patient's ability to progress to PLOF and address remaining functional goals.  (see flow sheet as

## 2025-02-11 ENCOUNTER — HOSPITAL ENCOUNTER (OUTPATIENT)
Facility: HOSPITAL | Age: 85
Setting detail: RECURRING SERIES
Discharge: HOME | End: 2025-02-14
Payer: MEDICARE

## 2025-02-11 PROCEDURE — 97110 THERAPEUTIC EXERCISES: CPT

## 2025-02-11 PROCEDURE — 97112 NEUROMUSCULAR REEDUCATION: CPT

## 2025-02-11 PROCEDURE — 97016 VASOPNEUMATIC DEVICE THERAPY: CPT

## 2025-02-11 PROCEDURE — 97530 THERAPEUTIC ACTIVITIES: CPT

## 2025-02-11 NOTE — PROGRESS NOTES
[facility] at the [address], [place] [address]  Phone: [phone]      Fax:  [phone]    Progress Note    Patient name: Preston Nicole Start of Care: 2025   Referral source: Ahsan Desir PA-C : 1940   Medical/Treatment Diagnosis: Left shoulder pain [M25.512] Onset Date:DOS: 2024     Prior Hospitalization: see medical history Provider#: [id number]   Comorbidities: cataract surgery      Prior Level of Function: functionally independent, no AD, exercise = yoga/hiking/swimming     Reporting Period: 2025-2025    Visits from Start of Care: 8    Missed Visits: 0    If an interpreting service is utilized for treatment of this patient, the contents of this document represent the material reviewed with the patient via the .     Updated Goals/Measure of Progress:     Short Term Goals: STG- To be accomplished in 6 week(s):  1.  Pt will be compliant with HEP to encourage prophylaxis.   Eval:provided and reviewed HEP   Status on  25: pt reports attempting since IE -reviewed   Current 25: updated and reviewed HEP for AAROM with dowel wyatt  PN Status (2025): compliance daily with HEP, Met Currently     2.  Pt will increase left shoulder PROM to 160 deg flexion/ABD, and >60 deg ER/IR to improve mobility for ADLs.  Eval:                                           AROM                                                              PROM    Left Right   Left Right   Flexion  Flexion 92 165   Scaption/ABD  Scaptin/ABD 74 147   ER @ 0 Degrees NT NT ER @ 0 Degrees 20 NT   ER @ 90 Degrees NT 90 ER @ 90 Degrees NT 90   IR @ 90 Degrees NT 40 IR  Degrees @0 = 60 @ 90 = 45      PN Status (2025): progressing      PROM Left Right   Flexion 120 165   Scaptin/ABD 89 147   ER @ 0 Degrees 50 NT   ER @ 90 Degrees NT 90   IR  Degrees @0 = 65 @ 90 = 45      Long Term Goals: LTG- To be accomplished in 12

## 2025-02-11 NOTE — PROGRESS NOTES
PHYSICAL / OCCUPATIONAL THERAPY - DAILY TREATMENT NOTE     Patient Name: Preston Nicole    Date: 2025    : 1940  Insurance: Payor: HU[employer]NA MEDICARE / Plan: HU[employer]NA GOLD PLUS HMO / Product Type: *No Product type* /      Patient  verified Yes     Visit #   Current / Total 8 24   Time   In / Out 0844 0907   Pain   In / Out 1-2/10 <1/10   Subjective Functional Status/Changes: Patient reports that he's been noting mild shoulder pain since . He can't think of an inciting activity   Changes to:  Allergies, Med Hx, Sx Hx?   no       TREATMENT AREA =  Left shoulder pain [M25.512]    If an interpreting service is utilized for treatment of this patient, the contents of this document represent the material reviewed with the patient via the .     OBJECTIVE    Modalities Rationale:     decrease inflammation and decrease pain to improve patient's ability to progress to PLOF and address remaining functional goals.     min [] Estim Unattended, type/location:                                      []  w/ice    []  w/heat    min [] Estim Attended, type/location:                                     []  w/US     []  w/ice    []  w/heat    []  TENS insruct      min []  Mechanical Traction: type/lbs                   []  pro   []  sup   []  int   []  cont    []  before manual    []  after manual    min []  Ultrasound, settings/location:      min []  Iontophoresis w/ dexamethasone, location:                                               []  take home patch       []  in clinic        min  unbilled []  Ice     []  Heat    location/position:     min []  Paraffin,  details:    10 min []  Vasopneumatic Device, press/temp: Low pressure, seated, left shoulder, 34 deg F    min []  Whirlpool / Fluido:    If using vaso (only need to measure limb vaso being performed on)      pre-treatment girth : 47 cm      post-treatment girth : 46.8 cm      measured at (landmark location) :  acromion    min []  Other:    Skin assessment

## 2025-02-14 ENCOUNTER — HOSPITAL ENCOUNTER (OUTPATIENT)
Facility: HOSPITAL | Age: 85
Setting detail: RECURRING SERIES
Discharge: HOME | End: 2025-02-17
Payer: MEDICARE

## 2025-02-14 ENCOUNTER — TELEPHONE (OUTPATIENT)
Facility: HOSPITAL | Age: 85
End: 2025-02-14

## 2025-02-14 PROCEDURE — 97530 THERAPEUTIC ACTIVITIES: CPT

## 2025-02-14 PROCEDURE — 97112 NEUROMUSCULAR REEDUCATION: CPT

## 2025-02-14 PROCEDURE — 97110 THERAPEUTIC EXERCISES: CPT

## 2025-02-14 PROCEDURE — 97016 VASOPNEUMATIC DEVICE THERAPY: CPT

## 2025-02-14 NOTE — PROGRESS NOTES
improve functional activity tolerance for a return to regular exercise program.  Eval:worst pain = 8/10   Current 2/4/25: worst pain within the last week = 3-4/10 - progressing   PN Status (02/11/2025): worst pain within the last week = 2-3/10, MET     2.  Pt will increase left UE strength via  dynamometer by 5-10# to improve functional UE strength required for lifting/carrying items.  Eval: Strength : right = 83#, left = 70#   PN Status (02/11/2025): 75-80#, MET     3.  Pt will increase left shoulder AROM to 160 deg flexion/ABD, and >60 deg ER/IR to improve mobility for IADLs.  Eval:                                           AROM                                                              PROM    Left Right   Left Right   Flexion  Flexion 92 165   Scaption/ABD  Scaptin/ABD 74 147   ER @ 0 Degrees NT NT ER @ 0 Degrees 20 NT   ER @ 90 Degrees NT 90 ER @ 90 Degrees NT 90   IR @ 90 Degrees NT 40 IR  Degrees @0 = 60 @ 90 = 45      PN Status (02/11/2025): progressing   AAROM Left Right   Flexion 120 160   Scaption/ABD 75 140   ER @ 0 Degrees 39 NT   ER @ 90 Degrees NT 90   IR @ 90 Degrees NT 40      Current: [employer] scapular strengthening interventions to improve shoulder AROM 2/14/25    4.  Pt Quick Dash score will improvement of 15 points to show improvement in functional activity tolerance and improve QOL.   Eval:Quick Dash = 36.4  Status on (01/31/2025): 20.5%, MET    PLAN  Yes  Continue plan of care  []  Upgrade activities as tolerated  []  Discharge due to :  []  Other:    Sergio Vora PTA    2/14/2025    7:15 AM    Future Appointments   Date Time Provider Department Center   2/14/2025  9:20 AM Sergio Vora PTA [facility]   2/18/2025  8:40 AM Christina Elise, PT [facility]   2/21/2025  9:20 AM Sergio Vora PTA [facility]   2/25/2025  4:00 PM Molina Choudhury, PT [facility]   2/27/2025  8:00 AM Molina Choudhury, PT [facility]   3/4/2025  8:40 AM Teresa Camara PT [facility]   3/7/2025

## 2025-02-18 ENCOUNTER — HOSPITAL ENCOUNTER (OUTPATIENT)
Facility: HOSPITAL | Age: 85
Setting detail: RECURRING SERIES
Discharge: HOME | End: 2025-02-21
Payer: MEDICARE

## 2025-02-18 PROCEDURE — 97112 NEUROMUSCULAR REEDUCATION: CPT

## 2025-02-18 PROCEDURE — 97110 THERAPEUTIC EXERCISES: CPT

## 2025-02-18 PROCEDURE — 97140 MANUAL THERAPY 1/> REGIONS: CPT

## 2025-02-18 NOTE — PROGRESS NOTES
PHYSICAL / OCCUPATIONAL THERAPY - DAILY TREATMENT NOTE     Patient Name: Preston Nicole    Date: 2025    : 1940  Insurance: Payor: HUMANA MEDICARE / Plan: HU[employer]NA GOLD PLUS HMO / Product Type: *No Product type* /      Patient  verified Yes     Visit #   Current / Total 10 24   Time   In / Out 8:40am 9:24pm   Pain   In / Out 1 1   Subjective Functional Status/Changes: \"Can you update my homework?\"   Changes to:  Allergies, Med Hx, Sx Hx?   no       TREATMENT AREA =  Left shoulder pain [M25.512]    If an interpreting service is utilized for treatment of this patient, the contents of this document represent the material reviewed with the patient via the .     OBJECTIVE  Therapeutic Procedures:  Tx Min Billable or 1:1 Min (if diff from Tx Min) Procedure, Rationale, Specifics   18  78305 Therapeutic Exercise (timed):  increase ROM, strength, coordination, balance, and proprioception to improve patient's ability to progress to PLOF and address remaining functional goals. (see flow sheet as applicable)    Details if applicable:       16  93501 Neuromuscular Re-Education (timed):  improve balance, coordination, kinesthetic sense, posture, core stability and proprioception to improve patient's ability to develop conscious control of individual muscles and awareness of position of extremities in order to progress to PLOF and address remaining functional goals. (see flow sheet as applicable)    Details if applicable:     10  05846 Manual Therapy (timed):  decrease pain, increase ROM, increase tissue extensibility, decrease trigger points, and increase postural awareness to improve patient's ability to progress to PLOF and address remaining functional goals.  The manual therapy interventions were performed at a separate and distinct time from the therapeutic activities interventions . Details:  KLAUDIA AIC sternal rib mobs. Passive left shoulder ABD/ER/flexion. - hand placement with the pt's consent

## 2025-02-20 ENCOUNTER — TELEPHONE (OUTPATIENT)
Facility: HOSPITAL | Age: 85
End: 2025-02-20

## 2025-02-21 ENCOUNTER — APPOINTMENT (OUTPATIENT)
Facility: HOSPITAL | Age: 85
End: 2025-02-21
Payer: MEDICARE

## 2025-02-25 ENCOUNTER — HOSPITAL ENCOUNTER (OUTPATIENT)
Facility: HOSPITAL | Age: 85
Setting detail: RECURRING SERIES
Discharge: HOME | End: 2025-02-28
Payer: MEDICARE

## 2025-02-25 PROCEDURE — 97140 MANUAL THERAPY 1/> REGIONS: CPT

## 2025-02-25 PROCEDURE — 97110 THERAPEUTIC EXERCISES: CPT

## 2025-02-25 PROCEDURE — 97112 NEUROMUSCULAR REEDUCATION: CPT

## 2025-02-25 NOTE — PROGRESS NOTES
PHYSICAL / OCCUPATIONAL THERAPY - DAILY TREATMENT NOTE     Patient Name: Preston Nicole    Date: 2025    : 1940  Insurance: Payor: [employer] MEDICARE / Plan: [employer] GOLD PLUS HMO / Product Type: *No Product type* /      Patient  verified Yes     Visit #   Current / Total 11 24   Time   In / Out 1600 1640   Pain   In / Out 1 2   Subjective Functional Status/Changes: Saw ortho PA and was told things were going well. Keep working in PT. PA had him test standing AROM, he was not able to get fully overhead.    Changes to:  Allergies, Med Hx, Sx Hx?   no       TREATMENT AREA =  Left shoulder pain [M25.512]    If an interpreting service is utilized for treatment of this patient, the contents of this document represent the material reviewed with the patient via the .     OBJECTIVE    Modalities Rationale:     decrease edema and decrease inflammation to improve patient's ability to progress to PLOF and address remaining functional goals.     min [] Estim Unattended, type/location:                                      []  w/ice    []  w/heat    min [] Estim Attended, type/location:                                     []  w/US     []  w/ice    []  w/heat    []  TENS insruct      min []  Mechanical Traction: type/lbs                   []  pro   []  sup   []  int   []  cont    []  before manual    []  after manual    min []  Ultrasound, settings/location:      min []  Iontophoresis w/ dexamethasone, location:                                               []  take home patch       []  in clinic        min  unbilled []  Ice     []  Heat    location/position:     min []  Paraffin,  details:     min []  Vasopneumatic Device, press/temp:     min []  Whirlpool / Fluido:    If using vaso (only need to measure limb vaso being performed on)      pre-treatment girth :       post-treatment girth :       measured at (landmark location) :      min []  Other:    Skin assessment post-treatment (if applicable):    []

## 2025-02-27 ENCOUNTER — HOSPITAL ENCOUNTER (OUTPATIENT)
Facility: HOSPITAL | Age: 85
Setting detail: RECURRING SERIES
End: 2025-02-27
Payer: MEDICARE

## 2025-02-27 PROCEDURE — 97110 THERAPEUTIC EXERCISES: CPT

## 2025-02-27 PROCEDURE — 97140 MANUAL THERAPY 1/> REGIONS: CPT

## 2025-02-27 PROCEDURE — 97112 NEUROMUSCULAR REEDUCATION: CPT

## 2025-02-27 NOTE — PROGRESS NOTES
PHYSICAL / OCCUPATIONAL THERAPY - DAILY TREATMENT NOTE     Patient Name: Preston Nicole    Date: 2025    : 1940  Insurance: Payor: HU[employer] MEDICARE / Plan: [employer] GOLD PLUS HMO / Product Type: *No Product type* /      Patient  verified Yes     Visit #   Current / Total 12 24   Time   In / Out 0800 0840   Pain   In / Out 1 1   Subjective Functional Status/Changes: A little more pain today, tolerated last session well.    Changes to:  Allergies, Med Hx, Sx Hx?   no       TREATMENT AREA =  Left shoulder pain [M25.512]    If an interpreting service is utilized for treatment of this patient, the contents of this document represent the material reviewed with the patient via the .     OBJECTIVE    Modalities Rationale:     decrease edema and decrease inflammation to improve patient's ability to progress to PLOF and address remaining functional goals.     min [] Estim Unattended, type/location:                                      []  w/ice    []  w/heat    min [] Estim Attended, type/location:                                     []  w/US     []  w/ice    []  w/heat    []  TENS insruct      min []  Mechanical Traction: type/lbs                   []  pro   []  sup   []  int   []  cont    []  before manual    []  after manual    min []  Ultrasound, settings/location:      min []  Iontophoresis w/ dexamethasone, location:                                               []  take home patch       []  in clinic        min  unbilled []  Ice     []  Heat    location/position:     min []  Paraffin,  details:     min []  Vasopneumatic Device, press/temp:     min []  Whirlpool / Fluido:    If using vaso (only need to measure limb vaso being performed on)      pre-treatment girth :       post-treatment girth :       measured at (landmark location) :      min []  Other:    Skin assessment post-treatment (if applicable):    []  intact    []  redness- no adverse reaction                 []redness - adverse reaction:

## 2025-03-04 ENCOUNTER — HOSPITAL ENCOUNTER (OUTPATIENT)
Facility: HOSPITAL | Age: 85
Setting detail: RECURRING SERIES
Discharge: HOME | End: 2025-03-07
Payer: MEDICARE

## 2025-03-04 PROCEDURE — 97140 MANUAL THERAPY 1/> REGIONS: CPT

## 2025-03-04 PROCEDURE — 97530 THERAPEUTIC ACTIVITIES: CPT

## 2025-03-04 PROCEDURE — 97110 THERAPEUTIC EXERCISES: CPT

## 2025-03-04 NOTE — PROGRESS NOTES
TIMED therapeutic procedures (example: do not include dry needle or estim unattended, both untimed codes, in totals to left)  8-22 min = 1 unit; 23-37 min = 2 units; 38-52 min = 3 units; 53-67 min = 4 units; 68-82 min = 5 units   Total Total     TOTAL TREATMENT TIME:        40     [x]  Patient Education billed concurrently with other procedures   [x] Review HEP    [] Progressed/Changed HEP, detail:    [] Other detail:       Objective Information/Functional Measures/Assessment    Patient tolerated treatment session well today. Patient had no complaints with addition of purple Theraband for rows to exercise program to accomplish improved scapular strength. Patient continues to demonstrate mild shoulder hike with left shoulder flexion AROM, but this is minimized with VC for increased attention in the mirror. Patient initially demonstrated trunk extension with this flexion AROM as well, but this was completely reduced with one VC.  Patient continues to make good progress toward goals and would benefit from continued skilled PT intervention to address remaining deficits outlined in goals below.    Plan for Next Session:   -S/L shoulder ER      Patient will continue to benefit from skilled PT / OT services to modify and progress therapeutic interventions, analyze and address functional mobility deficits, analyze and address ROM deficits, analyze and address strength deficits, analyze and address soft tissue restrictions, analyze and cue for proper movement patterns, and analyze and modify for postural abnormalities to address functional deficits and attain remaining goals.    Progress toward goals / Updated goals:  []  See Progress Note/Recertification    Short Term Goals: STG- To be accomplished in 6 week(s):  1.  Pt will be compliant with HEP to encourage prophylaxis.   Eval:provided and reviewed HEP   Status on  1/20/25: pt reports attempting since IE -reviewed   Current 2/4/25: updated and reviewed HEP for AAROM with

## 2025-03-07 ENCOUNTER — HOSPITAL ENCOUNTER (OUTPATIENT)
Facility: HOSPITAL | Age: 85
Setting detail: RECURRING SERIES
Discharge: HOME | End: 2025-03-10
Payer: MEDICARE

## 2025-03-07 PROCEDURE — 97110 THERAPEUTIC EXERCISES: CPT

## 2025-03-07 PROCEDURE — 97112 NEUROMUSCULAR REEDUCATION: CPT

## 2025-03-07 PROCEDURE — 97140 MANUAL THERAPY 1/> REGIONS: CPT

## 2025-03-07 NOTE — PROGRESS NOTES
PHYSICAL / OCCUPATIONAL THERAPY - DAILY TREATMENT NOTE     Patient Name: Preston Nicole    Date: 3/7/2025    : 1940  Insurance: Payor: HU[employer] MEDICARE / Plan: [employer] GOLD PLUS HMO / Product Type: *No Product type* /      Patient  verified Yes     Visit #   Current / Total 14 24   Time   In / Out 8:40 9:20   Pain   In / Out 0 1   Subjective Functional Status/Changes: I've barely felt any pain over the past few days    Changes to:  Allergies, Med Hx, Sx Hx?   no       TREATMENT AREA =  Left shoulder pain [M25.512]    If an interpreting service is utilized for treatment of this patient, the contents of this document represent the material reviewed with the patient via the .     OBJECTIVE    Therapeutic Procedures:  Tx Min Billable or 1:1 Min (if diff from Tx Min) Procedure, Rationale, Specifics   22  26312 Therapeutic Exercise (timed):  increase ROM, strength, coordination, balance, and proprioception to improve patient's ability to progress to PLOF and address remaining functional goals. (see flow sheet as applicable)    Details if applicable:       10  60226 Neuromuscular Re-Education (timed):  improve balance, coordination, kinesthetic sense, posture, core stability and proprioception to improve patient's ability to develop conscious control of individual muscles and awareness of position of extremities in order to progress to PLOF and address remaining functional goals. (see flow sheet as applicable)    Details if applicable:     8  98797 Manual Therapy (timed):  decrease pain, increase ROM, and increase tissue extensibility to improve patient's ability to progress to PLOF and address remaining functional goals.  The manual therapy interventions were performed at a separate and distinct time from the therapeutic activities interventions . Details: PROM of left shoulder with focus on flexion and abduction        Details if applicable:     40  [facility] Totals Reminder: bill using total billable min

## 2025-03-11 ENCOUNTER — HOSPITAL ENCOUNTER (OUTPATIENT)
Facility: HOSPITAL | Age: 85
Setting detail: RECURRING SERIES
Discharge: HOME | End: 2025-03-14
Payer: MEDICARE

## 2025-03-11 PROCEDURE — 97140 MANUAL THERAPY 1/> REGIONS: CPT

## 2025-03-11 PROCEDURE — 97110 THERAPEUTIC EXERCISES: CPT

## 2025-03-11 PROCEDURE — 97530 THERAPEUTIC ACTIVITIES: CPT

## 2025-03-11 NOTE — PROGRESS NOTES
[facility] at the [address], [place] [address]  Phone: [phone]      Fax:  [phone]    Progress Note    Patient name: Preston Nicole Start of Care: 2025   Referral source: Ahsan Desir PA-C : 1940   Medical/Treatment Diagnosis: Left shoulder pain [M25.512] Onset Date:s/p 24      Prior Hospitalization: see medical history Provider#: [id number]   Comorbidities: cataract surgery      Prior Level of Function: functionally independent, no AD, exercise = yoga/hiking/swimming     Reporting Period: 2025-2025    Visits from Start of Care: 15    Missed Visits: 0    If an interpreting service is utilized for treatment of this patient, the contents of this document represent the material reviewed with the patient via the .     Updated Goals/Measure of Progress:     Short Term Goals: STG- To be accomplished in 6 week(s):  1.  Pt will be compliant with HEP to encourage prophylaxis.   Eval:provided and reviewed HEP   Status on  25: pt reports attempting since IE -reviewed   Current 25: updated and reviewed HEP for AAROM with dowel wyatt  PN Status (2025): compliance daily with HEP, Met Currently  PN Status (2025): compliance daily with all HEP, MET     2.  Pt will increase left shoulder PROM to 160 deg flexion/ABD, and >60 deg ER/IR to improve mobility for ADLs.  Eval:                                           AROM                                                              PROM    Left Right   Left Right   Flexion  Flexion 92 165   Scaption/ABD  Scaptin/ABD 74 147   ER @ 0 Degrees NT NT ER @ 0 Degrees 20 NT   ER @ 90 Degrees NT 90 ER @ 90 Degrees NT 90   IR @ 90 Degrees NT 40 IR  Degrees @0 = 60 @ 90 = 45      PN Status (2025): progressing      PROM Left Right   Flexion 120 165   Scaptin/ABD 89 147   ER @ 0 Degrees 50 NT   ER @ 90 Degrees NT 90   IR  Degrees @0 = 65 @ 90 = 45

## 2025-03-11 NOTE — PROGRESS NOTES
PHYSICAL / OCCUPATIONAL THERAPY - DAILY TREATMENT NOTE     Patient Name: Preston Nicole    Date: 3/11/2025    : 1940  Insurance: Payor: HU[employer]NA MEDICARE / Plan: HU[employer] GOLD PLUS HMO / Product Type: *No Product type* /      Patient  verified Yes     Visit #   Current / Total 15 24   Time   In / Out 0845 0930   Pain   In / Out 0/10 1/10   Subjective Functional Status/Changes: Patient reports that he still has to reach over with the RUE to use the ANURADHA, but he was able to reach up to button the top button of his shirt when doing laundry.   Changes to:  Allergies, Med Hx, Sx Hx?   no       TREATMENT AREA =  Left shoulder pain [M25.512]    If an interpreting service is utilized for treatment of this patient, the contents of this document represent the material reviewed with the patient via the .     OBJECTIVE    Therapeutic Procedures:  Tx Min Billable or 1:1 Min (if diff from Tx Min) Procedure, Rationale, Specifics   22  59794 Therapeutic Exercise (timed):  increase ROM, strength, coordination, balance, and proprioception to improve patient's ability to progress to PLOF and address remaining functional goals. (see flow sheet as applicable)    Details if applicable:       15  35179 Therapeutic Activity (timed):  use of dynamic activities replicating functional movements to increase ROM, strength, coordination, balance, and proprioception in order to improve patient's ability to progress to PLOF and address remaining functional goals.  (see flow sheet as applicable)    Details if applicable:     8  08680 Manual Therapy (timed):  decrease pain, increase ROM, and increase tissue extensibility to improve patient's ability to progress to PLOF and address remaining functional goals.  The manual therapy interventions were performed at a separate and distinct time from the therapeutic activities interventions . Details: PROM of left shoulder with focus on flexion and abduction      Details if applicable:     45

## 2025-03-14 ENCOUNTER — HOSPITAL ENCOUNTER (OUTPATIENT)
Facility: HOSPITAL | Age: 85
Setting detail: RECURRING SERIES
Discharge: HOME | End: 2025-03-17
Payer: MEDICARE

## 2025-03-14 PROCEDURE — 97530 THERAPEUTIC ACTIVITIES: CPT

## 2025-03-14 PROCEDURE — 97110 THERAPEUTIC EXERCISES: CPT

## 2025-03-14 PROCEDURE — 97140 MANUAL THERAPY 1/> REGIONS: CPT

## 2025-03-14 NOTE — PROGRESS NOTES
IR  Degrees @0 = 60 @ 90 = 45      PN Status (02/11/2025): progressing   AAROM Left Right   Flexion 120 160   Scaption/ABD 75 140   ER @ 0 Degrees 39 NT   ER @ 90 Degrees NT 90   IR @ 90 Degrees NT 40      PN Status (03/11/2025): progressing   AROM Left Right   Flexion 105 160   Scaption/ABD 78 140   ER @ 0 Degrees 42 NT   ER @ 90 Degrees 31 90   IR @ 90 Degrees 28 40      4.  Pt Quick Dash score will improvement of 15 points to show improvement in functional activity tolerance and improve QOL.   Eval:Quick Dash = 36.4  Status on (01/31/2025): 20.5%, MET    PLAN  Yes  Continue plan of care  []  Upgrade activities as tolerated  []  Discharge due to :  []  Other:    Teresa Camara PT    3/14/2025    8:32 AM    Future Appointments   Date Time Provider Department Center   3/14/2025  8:40 AM Teresa Camara, PT [facility]   3/18/2025  8:40 AM Teresa Camara, PT [facility]   3/21/2025  8:40 AM Teresa Camara, PT [facility]   3/25/2025  8:40 AM Teresa Camara, PT [facility]   3/28/2025  8:40 AM Teresa Camara, PT [facility]   4/1/2025  8:40 AM Teresa Camara, PT [facility]   4/4/2025  8:40 AM Sergio Vora, PTA [facility]   4/8/2025  8:40 AM Teresa Camara, PT [facility]   4/11/2025  9:20 AM Teresa Camara, PT [facility]

## 2025-03-18 ENCOUNTER — HOSPITAL ENCOUNTER (OUTPATIENT)
Facility: HOSPITAL | Age: 85
Setting detail: RECURRING SERIES
Discharge: HOME | End: 2025-03-21
Payer: MEDICARE

## 2025-03-18 PROCEDURE — 97110 THERAPEUTIC EXERCISES: CPT

## 2025-03-18 PROCEDURE — 97530 THERAPEUTIC ACTIVITIES: CPT

## 2025-03-18 PROCEDURE — 97112 NEUROMUSCULAR REEDUCATION: CPT

## 2025-03-18 NOTE — PROGRESS NOTES
Degrees @0 = 60 @ 90 = 45      PN Status (02/11/2025): progressing   AAROM Left Right   Flexion 120 160   Scaption/ABD 75 140   ER @ 0 Degrees 39 NT   ER @ 90 Degrees NT 90   IR @ 90 Degrees NT 40      PN Status (03/11/2025): progressing   AROM Left Right   Flexion 105 160   Scaption/ABD 78 140   ER @ 0 Degrees 42 NT   ER @ 90 Degrees 31 90   IR @ 90 Degrees 28 40      4.  Pt Quick Dash score will improvement of 15 points to show improvement in functional activity tolerance and improve QOL.   Eval:Quick Dash = 36.4  Status on (01/31/2025): 20.5%, MET    PLAN  Yes  Continue plan of care  []  Upgrade activities as tolerated  []  Discharge due to :  []  Other:    Teresa Camara PT    3/18/2025    7:20 AM    Future Appointments   Date Time Provider Department Center   3/18/2025  8:40 AM Teresa Camara, PT [facility]   3/21/2025  8:40 AM Teresa Camara, PT [facility]   3/25/2025  8:40 AM Teresa Camara, PT [facility]   3/28/2025  8:40 AM Teresa Camara, PT [facility]   4/1/2025  8:40 AM Teresa Camara, PT [facility]   4/4/2025  8:40 AM Sergio Vora, PTA [facility]   4/8/2025  8:40 AM Teresa Camara, PT [facility]   4/11/2025  9:20 AM Teresa Camara, PT [facility]

## 2025-03-21 ENCOUNTER — HOSPITAL ENCOUNTER (OUTPATIENT)
Facility: HOSPITAL | Age: 85
Setting detail: RECURRING SERIES
Discharge: HOME | End: 2025-03-24
Payer: MEDICARE

## 2025-03-21 PROCEDURE — 97530 THERAPEUTIC ACTIVITIES: CPT

## 2025-03-21 PROCEDURE — 97112 NEUROMUSCULAR REEDUCATION: CPT

## 2025-03-21 PROCEDURE — 97110 THERAPEUTIC EXERCISES: CPT

## 2025-03-21 NOTE — PROGRESS NOTES
PHYSICAL / OCCUPATIONAL THERAPY - DAILY TREATMENT NOTE     Patient Name: Preston Nicole    Date: 3/21/2025    : 1940  Insurance: Payor: HU[employer]NA MEDICARE / Plan: [employer] GOLD PLUS HMO / Product Type: *No Product type* /      Patient  verified Yes     Visit #   Current / Total 18 24   Time   In / Out 0839 0926   Pain   In / Out <1/10 0/10   Subjective Functional Status/Changes: Mr. Nicole can now punch in his code with his left hand, but he can't reach the top of the screen to complete the transaction.    Changes to:  Allergies, Med Hx, Sx Hx?   no       TREATMENT AREA =  Left shoulder pain [M25.512]    If an interpreting service is utilized for treatment of this patient, the contents of this document represent the material reviewed with the patient via the .     OBJECTIVE    Therapeutic Procedures:  Tx Min Billable or 1:1 Min (if diff from Tx Min) Procedure, Rationale, Specifics   18  65318 Therapeutic Exercise (timed):  increase ROM, strength, coordination, balance, and proprioception to improve patient's ability to progress to PLOF and address remaining functional goals. (see flow sheet as applicable)    Details if applicable:       16  11311 Neuromuscular Re-Education (timed):  improve balance, coordination, kinesthetic sense, posture, core stability and proprioception to improve patient's ability to develop conscious control of individual muscles and awareness of position of extremities in order to progress to PLOF and address remaining functional goals. (see flow sheet as applicable)    Details if applicable:     13  41478 Therapeutic Activity (timed):  use of dynamic activities replicating functional movements to increase ROM, strength, coordination, balance, and proprioception in order to improve patient's ability to progress to PLOF and address remaining functional goals.  (see flow sheet as applicable)     Details if applicable:     47  [facility] Totals Reminder: bill using total billable min of

## 2025-03-24 NOTE — TELEPHONE ENCOUNTER
Confirmed arrival time @ 11am for 9/3 eval & reminded to bring ins card / ID.     influenza, injectable, quadrivalent, preservative free; 22-Sep-2020 12:56; Germaine Velazquez (RN); [employer]; [id number] (Exp. Date: 30-Jun-2021); IntraMuscular; Deltoid Right.; 0.5 milliLiter(s); VIS (VIS Published: 15-Aug-2019, VIS Presented: 22-Sep-2020);   Tdap; 29-Apr-2021 07:27; Yessi Pérez (ANNA); [employer]; [id number] (Exp. Date: 18-Nov-2022); IntraMuscular; Deltoid Right.; 0.5 milliLiter(s); VIS (VIS Published: 09-May-2013, VIS Presented: 29-Apr-2021);

## 2025-03-25 ENCOUNTER — HOSPITAL ENCOUNTER (OUTPATIENT)
Facility: HOSPITAL | Age: 85
Setting detail: RECURRING SERIES
Discharge: HOME | End: 2025-03-28
Payer: MEDICARE

## 2025-03-25 PROCEDURE — 97140 MANUAL THERAPY 1/> REGIONS: CPT

## 2025-03-25 PROCEDURE — 97530 THERAPEUTIC ACTIVITIES: CPT

## 2025-03-25 PROCEDURE — 97110 THERAPEUTIC EXERCISES: CPT

## 2025-03-25 NOTE — PROGRESS NOTES
PHYSICAL / OCCUPATIONAL THERAPY - DAILY TREATMENT NOTE     Patient Name: Preston Nicole    Date: 3/25/2025    : 1940  Insurance: Payor: HU[employer]NA MEDICARE / Plan: [employer] GOLD PLUS HMO / Product Type: *No Product type* /      Patient  verified Yes     Visit #   Current / Total 19 24   Time   In / Out 0842 0925   Pain   In / Out 0/10 0/10   Subjective Functional Status/Changes: Preston has nothing new to report regarding his shoulder.    Changes to:  Allergies, Med Hx, Sx Hx?   no       TREATMENT AREA =  Left shoulder pain [M25.512]    If an interpreting service is utilized for treatment of this patient, the contents of this document represent the material reviewed with the patient via the .     OBJECTIVE    Therapeutic Procedures:  Tx Min Billable or 1:1 Min (if diff from Tx Min) Procedure, Rationale, Specifics     31898 Therapeutic Exercise (timed):  increase ROM, strength, coordination, balance, and proprioception to improve patient's ability to progress to PLOF and address remaining functional goals. (see flow sheet as applicable)    Details if applicable:       530 Therapeutic Activity (timed):  use of dynamic activities replicating functional movements to increase ROM, strength, coordination, balance, and proprioception in order to improve patient's ability to progress to PLOF and address remaining functional goals.  (see flow sheet as applicable)     Details if applicable:     10  39993 Manual Therapy (timed):  decrease pain, increase ROM, and increase tissue extensibility to improve patient's ability to progress to PLOF and address remaining functional goals.  The manual therapy interventions were performed at a separate and distinct time from the therapeutic activities interventions . Details: PROM of left shoulder into abduction, ER, and IR    Details if applicable:     43  [facility] Totals Reminder: bill using total billable min of TIMED therapeutic procedures (example: do not include dry

## 2025-03-28 ENCOUNTER — HOSPITAL ENCOUNTER (OUTPATIENT)
Facility: HOSPITAL | Age: 85
Setting detail: RECURRING SERIES
Discharge: HOME | End: 2025-03-31
Payer: MEDICARE

## 2025-03-28 PROCEDURE — 97140 MANUAL THERAPY 1/> REGIONS: CPT

## 2025-03-28 PROCEDURE — 97112 NEUROMUSCULAR REEDUCATION: CPT

## 2025-03-28 PROCEDURE — 97110 THERAPEUTIC EXERCISES: CPT

## 2025-03-28 NOTE — PROGRESS NOTES
PHYSICAL / OCCUPATIONAL THERAPY - DAILY TREATMENT NOTE     Patient Name: Preston Nicole    Date: 3/28/2025    : 1940  Insurance: Payor: HUMANA MEDICARE / Plan: [employer] GOLD PLUS HMO / Product Type: *No Product type* /      Patient  verified Yes     Visit #   Current / Total 20 24   Time   In / Out 837 923   Pain   In / Out 0 0   Subjective Functional Status/Changes: Getting there. Still hard to lift out to the side. MD wants me to continue with therapy   Changes to:  Allergies, Med Hx, Sx Hx?   no       TREATMENT AREA =  Left shoulder pain [M25.512]    If an interpreting service is utilized for treatment of this patient, the contents of this document represent the material reviewed with the patient via the .     OBJECTIVE      Therapeutic Procedures:  Tx Min Billable or 1:1 Min (if diff from Tx Min) Procedure, Rationale, Specifics    12293 Therapeutic Exercise (timed):  increase ROM, strength, coordination, balance, and proprioception to improve patient's ability to progress to PLOF and address remaining functional goals. (see flow sheet as applicable)    Details if applicable:       10 10 70343 Neuromuscular Re-Education (timed):  improve balance, coordination, kinesthetic sense, posture, core stability and proprioception to improve patient's ability to develop conscious control of individual muscles and awareness of position of extremities in order to progress to PLOF and address remaining functional goals. (see flow sheet as applicable)    Details if applicable:     10 10 13883 Manual Therapy (timed):  decrease pain, increase ROM, and increase tissue extensibility to improve patient's ability to progress to PLOF and address remaining functional goals.  The manual therapy interventions were performed at a separate and distinct time from the therapeutic activities interventions . Details: grade II posterior and inferior GHJ mobs, scap mosb in s/l       Details if applicable:

## 2025-03-31 ENCOUNTER — TELEPHONE (OUTPATIENT)
Facility: HOSPITAL | Age: 85
End: 2025-03-31

## 2025-04-01 ENCOUNTER — HOSPITAL ENCOUNTER (OUTPATIENT)
Facility: HOSPITAL | Age: 85
Setting detail: RECURRING SERIES
Discharge: HOME | End: 2025-04-04
Payer: MEDICARE

## 2025-04-01 PROCEDURE — 97112 NEUROMUSCULAR REEDUCATION: CPT

## 2025-04-01 PROCEDURE — 97530 THERAPEUTIC ACTIVITIES: CPT

## 2025-04-01 PROCEDURE — 97110 THERAPEUTIC EXERCISES: CPT

## 2025-04-01 NOTE — PROGRESS NOTES
PHYSICAL / OCCUPATIONAL THERAPY - DAILY TREATMENT NOTE     Patient Name: Preston Nicole    Date: 2025    : 1940  Insurance: Payor: HUMANA MEDICARE / Plan: [employer]NA GOLD PLUS HMO / Product Type: *No Product type* /      Patient  verified Yes     Visit #   Current / Total 21 24   Time   In / Out 0838 0930   Pain   In / Out 0/10 1/10   Subjective Functional Status/Changes: Patient states that he will have to cancel one of his upcoming appointments secondary to losing his brother over the weekend. So he will have to travel to [place] for the .    Changes to:  Allergies, Med Hx, Sx Hx?   no       TREATMENT AREA =  Left shoulder pain [M25.512]    If an interpreting service is utilized for treatment of this patient, the contents of this document represent the material reviewed with the patient via the .     OBJECTIVE    Therapeutic Procedures:  Tx Min Billable or 1:1 Min (if diff from Tx Min) Procedure, Rationale, Specifics   22 16 68980 Therapeutic Exercise (timed):  increase ROM, strength, coordination, balance, and proprioception to improve patient's ability to progress to PLOF and address remaining functional goals. (see flow sheet as applicable)    Details if applicable:       15 15 86763 Neuromuscular Re-Education (timed):  improve balance, coordination, kinesthetic sense, posture, core stability and proprioception to improve patient's ability to develop conscious control of individual muscles and awareness of position of extremities in order to progress to PLOF and address remaining functional goals. (see flow sheet as applicable)    Details if applicable:     15 15 01747 Therapeutic Activity (timed):  use of dynamic activities replicating functional movements to increase ROM, strength, coordination, balance, and proprioception in order to improve patient's ability to progress to PLOF and address remaining functional goals.  (see flow sheet as applicable)     Details if applicable:     52

## 2025-04-03 ENCOUNTER — TELEPHONE (OUTPATIENT)
Facility: HOSPITAL | Age: 85
End: 2025-04-03

## 2025-04-04 ENCOUNTER — HOSPITAL ENCOUNTER (OUTPATIENT)
Facility: HOSPITAL | Age: 85
Setting detail: RECURRING SERIES
Discharge: HOME | End: 2025-04-07
Payer: MEDICARE

## 2025-04-04 PROCEDURE — 97110 THERAPEUTIC EXERCISES: CPT

## 2025-04-04 PROCEDURE — 97112 NEUROMUSCULAR REEDUCATION: CPT

## 2025-04-04 PROCEDURE — 97530 THERAPEUTIC ACTIVITIES: CPT

## 2025-04-04 NOTE — PROGRESS NOTES
PHYSICAL / OCCUPATIONAL THERAPY - DAILY TREATMENT NOTE     Patient Name: Preston Nicole    Date: 2025    : 1940  Insurance: Payor: [employer] MEDICARE / Plan: [employer] GOLD PLUS HMO / Product Type: *No Product type* /      Patient  verified Yes     Visit #   Current / Total 22 24   Time   In / Out 8:39 9:21   Pain   In / Out 0 0-1   Subjective Functional Status/Changes: Pt reports no new complaints   Changes to:  Allergies, Med Hx, Sx Hx?   no       TREATMENT AREA =  Left shoulder pain [M25.512]    If an interpreting service is utilized for treatment of this patient, the contents of this document represent the material reviewed with the patient via the .     OBJECTIVE    Therapeutic Procedures:  Tx Min Billable or 1:1 Min (if diff from Tx Min) Procedure, Rationale, Specifics   19  64689 Therapeutic Exercise (timed):  increase ROM, strength, coordination, balance, and proprioception to improve patient's ability to progress to PLOF and address remaining functional goals. (see flow sheet as applicable)    Details if applicable:       11  19542 Neuromuscular Re-Education (timed):  improve balance, coordination, kinesthetic sense, posture, core stability and proprioception to improve patient's ability to develop conscious control of individual muscles and awareness of position of extremities in order to progress to PLOF and address remaining functional goals. (see flow sheet as applicable)    Details if applicable:     11  43552 Therapeutic Activity (timed):  use of dynamic activities replicating functional movements to increase ROM, strength, coordination, balance, and proprioception in order to improve patient's ability to progress to PLOF and address remaining functional goals.  (see flow sheet as applicable)     Details if applicable:     41  [facility] Totals Reminder: bill using total billable min of TIMED therapeutic procedures (example: do not include dry needle or estim unattended, both untimed

## 2025-04-08 ENCOUNTER — HOSPITAL ENCOUNTER (OUTPATIENT)
Facility: HOSPITAL | Age: 85
Setting detail: RECURRING SERIES
Discharge: HOME | End: 2025-04-11
Payer: MEDICARE

## 2025-04-08 PROCEDURE — 97530 THERAPEUTIC ACTIVITIES: CPT

## 2025-04-08 PROCEDURE — 97140 MANUAL THERAPY 1/> REGIONS: CPT

## 2025-04-08 PROCEDURE — 97112 NEUROMUSCULAR REEDUCATION: CPT

## 2025-04-08 NOTE — PROGRESS NOTES
[facility] [address], [place] [address]  Phone: [phone]      Fax:  [phone]    Continued Plan of Care/ Re-certification for Physical Therapy Services    Patient name: Preston Nicole Start of Care: 2025   Referral source: Ahsan Desir PA-C : 1940   Medical/Treatment Diagnosis: Left shoulder pain Onset Date:s/p 24      Prior Hospitalization: see medical history Provider#: [id number]     Comorbidities: cataract surgery      Prior Level of Function: functionally independent, no AD, exercise = yoga/hiking/swimming     Visits from Start of Care: 23    Missed Visits: 0    If an interpreting service is utilized for treatment of this patient, the contents of this document represent the material reviewed with the patient via the .     Reporting Period: 2025 to 2025    The Plan of Care and following information is based on the patient's current status:    Key functional changes:    PROM Left Right   Flexion 147 165   Scaptin/ 147   ER @ 0 Degrees 52 NT   ER @ 90 Degrees 47 90   IR @ 90 Degrees  44 45         Problems/ barriers to goal attainment: post-op protocol restrictions, severity of tear     Problem List: pain affecting function, decrease ROM, decrease strength, decrease ADL/functional abilities, decrease activity tolerance, and decrease flexibility/joint mobility    Treatment Plan: Therapeutic exercise, Neuromuscular reeducation, Manual therapy, Therapeutic activity, Self care/home management, Electric stim unattended , Vasopneumatic device, and Electric stim attended     Goals for this certification period to be accomplished in 8 weeks  Short Term Goals: STG- To be accomplished in 6 week(s):  1.  Pt will be compliant with HEP to encourage prophylaxis.   Eval:provided and reviewed HEP   Status on  25: pt reports attempting since IE -reviewed   Current 25: updated and reviewed HEP for 
progressing   AROM Left Right   Flexion 113 160   Scaption/ABD 85 140   ER @ 0 Degrees 44 NT   ER @ 90 Degrees 37 90   IR @ 90 Degrees 30 40        4.  Pt Quick Dash score will improvement of 15 points to show improvement in functional activity tolerance and improve QOL.   Eval:Quick Dash = 36.4  Status on (01/31/2025): 20.5%, MET    New Goal (04/08/2025): Patient will improve left shoulder strength to 4+/5 to allow him to confidently return to the gym for upper body strength exercises as at PLOF.  PN Status (04/08/2025):   MMT Left Right   Flexion 3+/5 4+/5   Scaption/ABD 4-/5 4+/5   Extension 4+/5 4+/5   ER  3+/5 4+/5   IR 4/5 4+/5       PLAN  Yes  Continue plan of care  []  Upgrade activities as tolerated  []  Discharge due to :  []  Other:    Teresa Camara PT    4/8/2025    7:44 AM    Future Appointments   Date Time Provider Department Center   4/8/2025  8:40 AM Teresa Camara, PT [facility]   4/11/2025  9:20 AM Teresa Camara, PT [facility]   4/16/2025  2:00 PM Alex Avelar, PT [facility]   4/21/2025  8:40 AM Molina Choudhury, PT [facility]   4/30/2025 10:00 AM Molina Choudhury, PT [facility]

## 2025-04-11 ENCOUNTER — HOSPITAL ENCOUNTER (OUTPATIENT)
Facility: HOSPITAL | Age: 85
Setting detail: RECURRING SERIES
Discharge: HOME | End: 2025-04-14
Payer: MEDICARE

## 2025-04-11 PROCEDURE — 97112 NEUROMUSCULAR REEDUCATION: CPT

## 2025-04-11 PROCEDURE — 97530 THERAPEUTIC ACTIVITIES: CPT

## 2025-04-11 PROCEDURE — 97110 THERAPEUTIC EXERCISES: CPT

## 2025-04-11 NOTE — PROGRESS NOTES
PHYSICAL / OCCUPATIONAL THERAPY - DAILY TREATMENT NOTE     Patient Name: Preston Nicole    Date: 2025    : 1940  Insurance: Payor: [employer] MEDICARE / Plan: [employer] GOLD PLUS HMO / Product Type: *No Product type* /      Patient  verified Yes     Visit #   Current / Total 24 24   Time   In / Out 0920 1000   Pain   In / Out 0/10 <1/10   Subjective Functional Status/Changes: Patient is very hopeful that his insurance will extend the authorization as he'd really like to continue PT at this time. He does not feel that he is ready for discharge.   Changes to:  Allergies, Med Hx, Sx Hx?   no       TREATMENT AREA =  Left shoulder pain [M25.512]    If an interpreting service is utilized for treatment of this patient, the contents of this document represent the material reviewed with the patient via the .     OBJECTIVE    Therapeutic Procedures:  Tx Min Billable or 1:1 Min (if diff from Tx Min) Procedure, Rationale, Specifics   13  04754 Therapeutic Exercise (timed):  increase ROM, strength, coordination, balance, and proprioception to improve patient's ability to progress to PLOF and address remaining functional goals. (see flow sheet as applicable)    Details if applicable:       14  50368 Neuromuscular Re-Education (timed):  improve balance, coordination, kinesthetic sense, posture, core stability and proprioception to improve patient's ability to develop conscious control of individual muscles and awareness of position of extremities in order to progress to PLOF and address remaining functional goals. (see flow sheet as applicable)    Details if applicable:     13  81317 Therapeutic Activity (timed):  use of dynamic activities replicating functional movements to increase ROM, strength, coordination, balance, and proprioception in order to improve patient's ability to progress to PLOF and address remaining functional goals.  (see flow sheet as applicable)     Details if applicable:     40  [facility]

## 2025-04-16 ENCOUNTER — HOSPITAL ENCOUNTER (OUTPATIENT)
Facility: HOSPITAL | Age: 85
Setting detail: RECURRING SERIES
Discharge: HOME | End: 2025-04-19
Payer: MEDICARE

## 2025-04-16 PROCEDURE — 97530 THERAPEUTIC ACTIVITIES: CPT

## 2025-04-16 PROCEDURE — 97110 THERAPEUTIC EXERCISES: CPT

## 2025-04-16 PROCEDURE — 97112 NEUROMUSCULAR REEDUCATION: CPT

## 2025-04-16 NOTE — PROGRESS NOTES
PHYSICAL / OCCUPATIONAL THERAPY - DAILY TREATMENT NOTE     Patient Name: Preston Nicole    Date: 2025    : 1940  Insurance: Payor: HU[employer] MEDICARE / Plan: [employer] GOLD PLUS HMO / Product Type: *No Product type* /      Patient  verified Yes     Visit #   Current / Total 25 40   Time   In / Out 13:58 14:40   Pain   In / Out 0 0   Subjective Functional Status/Changes: I can move my arm relatively well, but the strength is still a work in progress   Changes to:  Allergies, Med Hx, Sx Hx?   no       TREATMENT AREA =  Left shoulder pain [M25.512]    If an interpreting service is utilized for treatment of this patient, the contents of this document represent the material reviewed with the patient via the .     OBJECTIVE    Therapeutic Procedures:  Tx Min Billable or 1:1 Min (if diff from Tx Min) Procedure, Rationale, Specifics   18  42843 Therapeutic Exercise (timed):  increase ROM, strength, coordination, balance, and proprioception to improve patient's ability to progress to PLOF and address remaining functional goals. (see flow sheet as applicable)    Details if applicable:       12  39544 Neuromuscular Re-Education (timed):  improve balance, coordination, kinesthetic sense, posture, core stability and proprioception to improve patient's ability to develop conscious control of individual muscles and awareness of position of extremities in order to progress to PLOF and address remaining functional goals. (see flow sheet as applicable)    Details if applicable:     12  85032 Therapeutic Activity (timed):  use of dynamic activities replicating functional movements to increase ROM, strength, coordination, balance, and proprioception in order to improve patient's ability to progress to PLOF and address remaining functional goals.  (see flow sheet as applicable)     Details if applicable:     42  [facility] Totals Reminder: bill using total billable min of TIMED therapeutic procedures (example: do not

## 2025-04-21 ENCOUNTER — HOSPITAL ENCOUNTER (OUTPATIENT)
Facility: HOSPITAL | Age: 85
Setting detail: RECURRING SERIES
Discharge: HOME | End: 2025-04-24
Payer: MEDICARE

## 2025-04-21 PROCEDURE — 97530 THERAPEUTIC ACTIVITIES: CPT

## 2025-04-21 PROCEDURE — 97110 THERAPEUTIC EXERCISES: CPT

## 2025-04-21 PROCEDURE — 97112 NEUROMUSCULAR REEDUCATION: CPT

## 2025-04-21 NOTE — PROGRESS NOTES
PHYSICAL / OCCUPATIONAL THERAPY - DAILY TREATMENT NOTE     Patient Name: Preston Nicole    Date: 2025    : 1940  Insurance: Payor: HU[employer] MEDICARE / Plan: [employer] GOLD PLUS HMO / Product Type: *No Product type* /      Patient  verified Yes     Visit #   Current / Total 26 40   Time   In / Out 8:37 9:17   Pain   In / Out .5 .5   Subjective Functional Status/Changes: Pt reports no new changes    Changes to:  Allergies, Med Hx, Sx Hx?   no       TREATMENT AREA =  Left shoulder pain [M25.512]    If an interpreting service is utilized for treatment of this patient, the contents of this document represent the material reviewed with the patient via the .     OBJECTIVE    Therapeutic Procedures:  Tx Min Billable or 1:1 Min (if diff from Tx Min) Procedure, Rationale, Specifics   19  31923 Therapeutic Exercise (timed):  increase ROM, strength, coordination, balance, and proprioception to improve patient's ability to progress to PLOF and address remaining functional goals. (see flow sheet as applicable)    Details if applicable:       10  68758 Neuromuscular Re-Education (timed):  improve balance, coordination, kinesthetic sense, posture, core stability and proprioception to improve patient's ability to develop conscious control of individual muscles and awareness of position of extremities in order to progress to PLOF and address remaining functional goals. (see flow sheet as applicable)    Details if applicable:     11  86152 Therapeutic Activity (timed):  use of dynamic activities replicating functional movements to increase ROM, strength, coordination, balance, and proprioception in order to improve patient's ability to progress to PLOF and address remaining functional goals.  (see flow sheet as applicable)     Details if applicable:     40  [facility] Totals Reminder: bill using total billable min of TIMED therapeutic procedures (example: do not include dry needle or estim unattended, both untimed

## 2025-04-28 ENCOUNTER — APPOINTMENT (OUTPATIENT)
Facility: HOSPITAL | Age: 85
End: 2025-04-28
Payer: MEDICARE

## 2025-04-30 ENCOUNTER — HOSPITAL ENCOUNTER (OUTPATIENT)
Facility: HOSPITAL | Age: 85
Setting detail: RECURRING SERIES
Discharge: HOME | End: 2025-05-03
Payer: MEDICARE

## 2025-04-30 PROCEDURE — 97110 THERAPEUTIC EXERCISES: CPT

## 2025-04-30 PROCEDURE — 97112 NEUROMUSCULAR REEDUCATION: CPT

## 2025-04-30 PROCEDURE — 97530 THERAPEUTIC ACTIVITIES: CPT

## 2025-04-30 NOTE — PROGRESS NOTES
PHYSICAL / OCCUPATIONAL THERAPY - DAILY TREATMENT NOTE     Patient Name: Preston Nicole    Date: 2025    : 1940  Insurance: Payor: HU[employer]NA MEDICARE / Plan: [employer] GOLD PLUS HMO / Product Type: *No Product type* /      Patient  verified Yes     Visit #   Current / Total 27 40   Time   In / Out 10:00 10:40   Pain   In / Out 0 0   Subjective Functional Status/Changes: Pt reports that his doctor was pleased with his progressions in UE strength at his appt yesterday   Changes to:  Allergies, Med Hx, Sx Hx?   no       TREATMENT AREA =  Left shoulder pain [M25.512]    If an interpreting service is utilized for treatment of this patient, the contents of this document represent the material reviewed with the patient via the .     OBJECTIVE    Therapeutic Procedures:  Tx Min Billable or 1:1 Min (if diff from Tx Min) Procedure, Rationale, Specifics   17  52857 Therapeutic Exercise (timed):  increase ROM, strength, coordination, balance, and proprioception to improve patient's ability to progress to PLOF and address remaining functional goals. (see flow sheet as applicable)    Details if applicable:       11  12590 Neuromuscular Re-Education (timed):  improve balance, coordination, kinesthetic sense, posture, core stability and proprioception to improve patient's ability to develop conscious control of individual muscles and awareness of position of extremities in order to progress to PLOF and address remaining functional goals. (see flow sheet as applicable)    Details if applicable:     12  46042 Therapeutic Activity (timed):  use of dynamic activities replicating functional movements to increase ROM, strength, coordination, balance, and proprioception in order to improve patient's ability to progress to PLOF and address remaining functional goals.  (see flow sheet as applicable)     Details if applicable:     40  [facility] Totals Reminder: bill using total billable min of TIMED therapeutic procedures

## 2025-05-06 ENCOUNTER — HOSPITAL ENCOUNTER (OUTPATIENT)
Facility: HOSPITAL | Age: 85
Setting detail: RECURRING SERIES
Discharge: HOME | End: 2025-05-09
Payer: MEDICARE

## 2025-05-06 PROCEDURE — 97530 THERAPEUTIC ACTIVITIES: CPT

## 2025-05-06 PROCEDURE — 97110 THERAPEUTIC EXERCISES: CPT

## 2025-05-06 PROCEDURE — 97112 NEUROMUSCULAR REEDUCATION: CPT

## 2025-05-06 NOTE — PROGRESS NOTES
PHYSICAL / OCCUPATIONAL THERAPY - DAILY TREATMENT NOTE     Patient Name: Preston Nicole    Date: 2025    : 1940  Insurance: Payor: HU[employer]NA MEDICARE / Plan: [employer] GOLD PLUS HMO / Product Type: *No Product type* /      Patient  verified Yes     Visit #   Current / Total 28 40   Time   In / Out 0842 0922   Pain   In / Out 0/10 0/10   Subjective Functional Status/Changes: Patient states that he was not able to get his HEP done when in [place] for his brother's , but otherwise, he's been faithful to his HEP.   Changes to:  Allergies, Med Hx, Sx Hx?   no       TREATMENT AREA =  Left shoulder pain [M25.512]    If an interpreting service is utilized for treatment of this patient, the contents of this document represent the material reviewed with the patient via the .     OBJECTIVE    Therapeutic Procedures:  Tx Min Billable or 1:1 Min (if diff from Tx Min) Procedure, Rationale, Specifics   14  05662 Therapeutic Exercise (timed):  increase ROM, strength, coordination, balance, and proprioception to improve patient's ability to progress to PLOF and address remaining functional goals. (see flow sheet as applicable)    Details if applicable:       10  88267 Neuromuscular Re-Education (timed):  improve balance, coordination, kinesthetic sense, posture, core stability and proprioception to improve patient's ability to develop conscious control of individual muscles and awareness of position of extremities in order to progress to PLOF and address remaining functional goals. (see flow sheet as applicable)    Details if applicable:     16  28144 Therapeutic Activity (timed):  use of dynamic activities replicating functional movements to increase ROM, strength, coordination, balance, and proprioception in order to improve patient's ability to progress to PLOF and address remaining functional goals.  (see flow sheet as applicable)    Details if applicable:     40   [facility] Totals Reminder: bill using total 
tolerance for a return to regular exercise program.  Eval:worst pain = 8/10   Current 2/4/25: worst pain within the last week = 3-4/10 - progressing   PN Status (02/11/2025): worst pain within the last week = 2-3/10, MET     2.  Pt will increase left UE strength via  dynamometer by 5-10# to improve functional UE strength required for lifting/carrying items.  Eval: Strength : right = 83#, left = 70#   PN Status (02/11/2025): 75-80#, MET     3.  Pt will increase left shoulder AROM to 160 deg flexion/ABD, and >60 deg ER/IR to improve mobility for IADLs.  Eval:                                           AROM                                                              PROM    Left Right   Left Right   Flexion  Flexion 92 165   Scaption/ABD  Scaptin/ABD 74 147   ER @ 0 Degrees NT NT ER @ 0 Degrees 20 NT   ER @ 90 Degrees NT 90 ER @ 90 Degrees NT 90   IR @ 90 Degrees NT 40 IR  Degrees @0 = 60 @ 90 = 45      Current: L UE: flexion 128, abduction 97, scaption 112, ER at 90 degrees 38 4/16/25  PN Status (5/6/25): progressing in all directions except IR   AROM Left Right   Flexion 125 160   Scaption/ABD 93 140   ER @ 0 Degrees 45 NT   ER @ 90 Degrees 40 90   IR @ 90 Degrees 30 40         4.  Pt Quick Dash score will improvement of 15 points to show improvement in functional activity tolerance and improve QOL.   Eval:Quick Dash = 36.4  Status on (01/31/2025): 20.5%, MET     New Goal (04/08/2025): Patient will improve left shoulder strength to 4+/5 to allow him to confidently return to the gym for upper body strength exercises as at PLOF.  Current: Shoulder IR 4+/5, shoulder ER 4-/5 4/30/25  PN Status (5/6/25): progressing  MMT Left Right   Flexion 4-/5 4+/5   Scaption/ABD 4-/5 4+/5   Extension 4+/5 4+/5   ER  4/5 4+/5   IR 4+/5 4+/5       Summary of Care/ Key Functional Changes:   Strength:   MMT Left Right   Flexion 4-/5 4+/5   Scaption/ABD 4-/5 4+/5   Extension 4+/5 4+/5   ER  4/5 4+/5   IR 4+/5 4+/5

## 2025-05-09 ENCOUNTER — HOSPITAL ENCOUNTER (OUTPATIENT)
Facility: HOSPITAL | Age: 85
Setting detail: RECURRING SERIES
Discharge: HOME | End: 2025-05-12
Payer: MEDICARE

## 2025-05-09 PROCEDURE — 97530 THERAPEUTIC ACTIVITIES: CPT

## 2025-05-09 PROCEDURE — 97110 THERAPEUTIC EXERCISES: CPT

## 2025-05-09 PROCEDURE — 97112 NEUROMUSCULAR REEDUCATION: CPT

## 2025-05-09 NOTE — PROGRESS NOTES
Flexion 125 160   Scaption/ABD 93 140   ER @ 0 Degrees 45 NT   ER @ 90 Degrees 40 90   IR @ 90 Degrees 30 40         4.  Pt Quick Dash score will improvement of 15 points to show improvement in functional activity tolerance and improve QOL.   Eval:Quick Dash = 36.4  Status on (01/31/2025): 20.5%, MET     New Goal (04/08/2025): Patient will improve left shoulder strength to 4+/5 to allow him to confidently return to the gym for upper body strength exercises as at PLOF.  PN Status (5/6/25): progressing  MMT Left Right   Flexion 4-/5 4+/5   Scaption/ABD 4-/5 4+/5   Extension 4+/5 4+/5   ER  4/5 4+/5   IR 4+/5 4+/5   Current (5/9/25): resistance progressed in rows, adjusted in IR and flexion for optimal muscle activation    PLAN  Yes  Continue plan of care  []  Upgrade activities as tolerated  []  Discharge due to :  []  Other:    Teresa Camara PT    5/9/2025    8:32 AM    Future Appointments   Date Time Provider Department Center   5/9/2025  8:40 AM Teresa Camara, PT [facility]   5/13/2025  8:40 AM Vidya Irwin, PT [facility]   5/16/2025  8:40 AM Teresa Camara, PT [facility]   5/20/2025  8:40 AM Teresa Camara, PT [facility]   5/23/2025  8:40 AM Sergio Vora, PTA [facility]   5/27/2025  8:40 AM Teresa Camara, PT [facility]   5/30/2025  9:20 AM Marcella Reddy, PT [facility]

## 2025-05-13 ENCOUNTER — HOSPITAL ENCOUNTER (OUTPATIENT)
Facility: HOSPITAL | Age: 85
Setting detail: RECURRING SERIES
Discharge: HOME | End: 2025-05-16
Payer: MEDICARE

## 2025-05-13 PROCEDURE — 97530 THERAPEUTIC ACTIVITIES: CPT

## 2025-05-13 PROCEDURE — 97140 MANUAL THERAPY 1/> REGIONS: CPT

## 2025-05-13 NOTE — PROGRESS NOTES
PHYSICAL / OCCUPATIONAL THERAPY - DAILY TREATMENT NOTE     Patient Name: Preston Nicole    Date: 2025    : 1940  Insurance: Payor: HU[employer] MEDICARE / Plan: [employer] GOLD PLUS HMO / Product Type: *No Product type* /      Patient  verified Yes     Visit #   Current / Total 30 40   Time   In / Out 8:41am 9:23am   Pain   In / Out 0 0   Subjective Functional Status/Changes: The pt states he is not starting to do arm exercises at the gym   Changes to:  Allergies, Med Hx, Sx Hx?   no       TREATMENT AREA =  Left shoulder pain [M25.512]    If an interpreting service is utilized for treatment of this patient, the contents of this document represent the material reviewed with the patient via the .     OBJECTIVE    Therapeutic Procedures:  Tx Min Billable or 1:1 Min (if diff from Tx Min) Procedure, Rationale, Specifics   20 0 [id number] Therapeutic Exercise (timed):  increase ROM, strength, coordination, balance, and proprioception to improve patient's ability to progress to PLOF and address remaining functional goals. (see flow sheet as applicable)    Details if applicable:       10 10 40821 Therapeutic Activity (timed):  use of dynamic activities replicating functional movements to increase ROM, strength, coordination, balance, and proprioception in order to improve patient's ability to progress to PLOF and address remaining functional goals.  (see flow sheet as applicable)    Details if applicable:      26216 Manual Therapy (timed):  decrease pain, increase ROM, and increase tissue extensibility to improve patient's ability to progress to PLOF and address remaining functional goals.  The manual therapy interventions were performed at a separate and distinct time from the therapeutic activities interventions . Details:        Details if applicable:  Supine = PROM to right shoulder via flexion, ABD, and IR/ER at 90 deg. Provided A/P grade 2-3 shoulder mobs to improve flexion and ER mobility

## 2025-05-16 ENCOUNTER — HOSPITAL ENCOUNTER (OUTPATIENT)
Facility: HOSPITAL | Age: 85
Setting detail: RECURRING SERIES
Discharge: HOME | End: 2025-05-19
Payer: MEDICARE

## 2025-05-16 PROCEDURE — 97112 NEUROMUSCULAR REEDUCATION: CPT

## 2025-05-16 PROCEDURE — 97110 THERAPEUTIC EXERCISES: CPT

## 2025-05-16 PROCEDURE — 97530 THERAPEUTIC ACTIVITIES: CPT

## 2025-05-16 NOTE — PROGRESS NOTES
PHYSICAL / OCCUPATIONAL THERAPY - DAILY TREATMENT NOTE     Patient Name: Preston Nicole    Date: 2025    : 1940  Insurance: Payor: HU[employer]NA MEDICARE / Plan: [employer] GOLD PLUS HMO / Product Type: *No Product type* /      Patient  verified Yes     Visit #   Current / Total 31 40   Time   In / Out 0836 0922   Pain   In / Out 1/10 1/10   Subjective Functional Status/Changes: Patient was able to return to doing a gentle yoga class   Changes to:  Allergies, Med Hx, Sx Hx?   no       TREATMENT AREA =  Left shoulder pain [M25.512]    If an interpreting service is utilized for treatment of this patient, the contents of this document represent the material reviewed with the patient via the .     OBJECTIVE    Therapeutic Procedures:  Tx Min Billable or 1:1 Min (if diff from Tx Min) Procedure, Rationale, Specifics   22  41445 Therapeutic Exercise (timed):  increase ROM, strength, coordination, balance, and proprioception to improve patient's ability to progress to PLOF and address remaining functional goals. (see flow sheet as applicable)    Details if applicable:       12  32557 Neuromuscular Re-Education (timed):  improve balance, coordination, kinesthetic sense, posture, core stability and proprioception to improve patient's ability to develop conscious control of individual muscles and awareness of position of extremities in order to progress to PLOF and address remaining functional goals. (see flow sheet as applicable)    Details if applicable:     12  59072 Therapeutic Activity (timed):  use of dynamic activities replicating functional movements to increase ROM, strength, coordination, balance, and proprioception in order to improve patient's ability to progress to PLOF and address remaining functional goals.  (see flow sheet as applicable)     Details if applicable:     46  [facility] Totals Reminder: bill using total billable min of TIMED therapeutic procedures (example: do not include dry needle or

## 2025-05-20 ENCOUNTER — HOSPITAL ENCOUNTER (OUTPATIENT)
Facility: HOSPITAL | Age: 85
Setting detail: RECURRING SERIES
Discharge: HOME | End: 2025-05-23
Payer: MEDICARE

## 2025-05-20 PROCEDURE — 97112 NEUROMUSCULAR REEDUCATION: CPT

## 2025-05-20 PROCEDURE — 97110 THERAPEUTIC EXERCISES: CPT

## 2025-05-20 PROCEDURE — 97530 THERAPEUTIC ACTIVITIES: CPT

## 2025-05-20 NOTE — PROGRESS NOTES
PHYSICAL / OCCUPATIONAL THERAPY - DAILY TREATMENT NOTE     Patient Name: Preston Nicole    Date: 2025    : 1940  Insurance: Payor: [employer] MEDICARE / Plan: [employer] GOLD PLUS HMO / Product Type: *No Product type* /      Patient  verified Yes     Visit #   Current / Total 32 40   Time   In / Out 0840 0927   Pain   In / Out 0/10 0/10   Subjective Functional Status/Changes: Patient states that he has been progressing well at the gym with respect to weights and reps on his IR and ER   Changes to:  Allergies, Med Hx, Sx Hx?   no       TREATMENT AREA =  Left shoulder pain [M25.512]    If an interpreting service is utilized for treatment of this patient, the contents of this document represent the material reviewed with the patient via the .     OBJECTIVE    Therapeutic Procedures:  Tx Min Billable or 1:1 Min (if diff from Tx Min) Procedure, Rationale, Specifics   21 16 45652 Therapeutic Exercise (timed):  increase ROM, strength, coordination, balance, and proprioception to improve patient's ability to progress to PLOF and address remaining functional goals. (see flow sheet as applicable)    Details if applicable:       12  21426 Neuromuscular Re-Education (timed):  improve balance, coordination, kinesthetic sense, posture, core stability and proprioception to improve patient's ability to develop conscious control of individual muscles and awareness of position of extremities in order to progress to PLOF and address remaining functional goals. (see flow sheet as applicable)    Details if applicable:      12 26405 Therapeutic Activity (timed):  use of dynamic activities replicating functional movements to increase ROM, strength, coordination, balance, and proprioception in order to improve patient's ability to progress to PLOF and address remaining functional goals.  (see flow sheet as applicable)     Details if applicable:     47 40 [facility] Totals Reminder: bill using total billable min of TIMED

## 2025-05-23 ENCOUNTER — HOSPITAL ENCOUNTER (OUTPATIENT)
Facility: HOSPITAL | Age: 85
Setting detail: RECURRING SERIES
Discharge: HOME | End: 2025-05-26
Payer: MEDICARE

## 2025-05-23 PROCEDURE — 97110 THERAPEUTIC EXERCISES: CPT

## 2025-05-23 PROCEDURE — 97112 NEUROMUSCULAR REEDUCATION: CPT

## 2025-05-23 PROCEDURE — 97530 THERAPEUTIC ACTIVITIES: CPT

## 2025-05-23 NOTE — PROGRESS NOTES
PHYSICAL / OCCUPATIONAL THERAPY - DAILY TREATMENT NOTE     Patient Name: Preston Nicole    Date: 2025    : 1940  Insurance: Payor: [employer] MEDICARE / Plan: [employer] GOLD PLUS HMO / Product Type: *No Product type* /      Patient  verified Yes     Visit #   Current / Total 33 40   Time   In / Out 8:38 9:18   Pain   In / Out 0 0   Subjective Functional Status/Changes: I've been able to move my arm around better in my yoga class, but still haven't tried swimming    Changes to:  Allergies, Med Hx, Sx Hx?   no       TREATMENT AREA =  Left shoulder pain [M25.512]    If an interpreting service is utilized for treatment of this patient, the contents of this document represent the material reviewed with the patient via the .     OBJECTIVE    Therapeutic Procedures:  Tx Min Billable or 1:1 Min (if diff from Tx Min) Procedure, Rationale, Specifics   19  35691 Therapeutic Exercise (timed):  increase ROM, strength, coordination, balance, and proprioception to improve patient's ability to progress to PLOF and address remaining functional goals. (see flow sheet as applicable)    Details if applicable:       11  04926 Neuromuscular Re-Education (timed):  improve balance, coordination, kinesthetic sense, posture, core stability and proprioception to improve patient's ability to develop conscious control of individual muscles and awareness of position of extremities in order to progress to PLOF and address remaining functional goals. (see flow sheet as applicable)    Details if applicable:     10  06795 Therapeutic Activity (timed):  use of dynamic activities replicating functional movements to increase ROM, strength, coordination, balance, and proprioception in order to improve patient's ability to progress to PLOF and address remaining functional goals.  (see flow sheet as applicable)     Details if applicable:     40  [facility] Totals Reminder: bill using total billable min of TIMED therapeutic procedures

## 2025-05-27 ENCOUNTER — HOSPITAL ENCOUNTER (OUTPATIENT)
Facility: HOSPITAL | Age: 85
Setting detail: RECURRING SERIES
Discharge: HOME | End: 2025-05-30
Payer: MEDICARE

## 2025-05-27 PROCEDURE — 97530 THERAPEUTIC ACTIVITIES: CPT

## 2025-05-27 PROCEDURE — 97110 THERAPEUTIC EXERCISES: CPT

## 2025-05-27 PROCEDURE — 97112 NEUROMUSCULAR REEDUCATION: CPT

## 2025-05-27 NOTE — PROGRESS NOTES
Physical Therapy Discharge Instructions                                   [facility] the [place] [address], [place] [address]                                     Phone: [phone]      Fax:  [phone]      Patient: Preston Nicole  : 1940      Continue Home Exercise Program 3-4 times per week for strength and 1-2 times per day for stretching      Continue with    [x] Ice  as needed after exercise     [] Heat           Follow up with MD:     [] Upon completion of therapy     [x] As needed      Recommendations:     []   Return to activity with home program    [x]   Return to activity with the following modifications:       []Post Rehab Program    []Join Independent aquatic program     [x]Return to/join local gym        Additional Comments: listen to your body as you progress your weights. Keep doing as you have at the gym and at home.           Teresa Camara, PT 2025 7:50 AM

## 2025-05-27 NOTE — PROGRESS NOTES
PHYSICAL / OCCUPATIONAL THERAPY - DAILY TREATMENT NOTE     Patient Name: Preston Nicole    Date: 2025    : 1940  Insurance: Payor: [employer] MEDICARE / Plan: [employer] GOLD PLUS HMO / Product Type: *No Product type* /      Patient  verified Yes     Visit #   Current / Total 34 40   Time   In / Out 0843 0923   Pain   In / Out 0/10 0/10   Subjective Functional Status/Changes: Mr. Nicole feels confident in his ability to complete his rehab on his own at this time.    Changes to:  Allergies, Med Hx, Sx Hx?   no       TREATMENT AREA =  Left shoulder pain [M25.512]    If an interpreting service is utilized for treatment of this patient, the contents of this document represent the material reviewed with the patient via the .     OBJECTIVE    Therapeutic Procedures:  Tx Min Billable or 1:1 Min (if diff from Tx Min) Procedure, Rationale, Specifics   16 14 23675 Therapeutic Exercise (timed):  increase ROM, strength, coordination, balance, and proprioception to improve patient's ability to progress to PLOF and address remaining functional goals. (see flow sheet as applicable)    Details if applicable:       12  95030 Neuromuscular Re-Education (timed):  improve balance, coordination, kinesthetic sense, posture, core stability and proprioception to improve patient's ability to develop conscious control of individual muscles and awareness of position of extremities in order to progress to PLOF and address remaining functional goals. (see flow sheet as applicable)    Details if applicable:     12  89507 Therapeutic Activity (timed):  use of dynamic activities replicating functional movements to increase ROM, strength, coordination, balance, and proprioception in order to improve patient's ability to progress to PLOF and address remaining functional goals.  (see flow sheet as applicable)     Details if applicable:     40 38 [facility] Totals Reminder: bill using total billable min of TIMED therapeutic procedures

## 2025-05-27 NOTE — PROGRESS NOTES
[facility] [address], [place] [address]  Phone: [phone]      Fax:  [phone]            Discharge Summary    Patient name: Preston Nicole     Start of Care: 2025  Referral source: Ahsan Desir PA-C    : 1940  Medical/Treatment Diagnosis: Left shoulder pain  Onset Date:s/p 24   Prior Hospitalization: see medical history   Provider#: [id number]  Comorbidities: cataract surgery      Prior Level of Function: functionally independent, no AD, exercise = yoga/hiking/swimming      Visits from Start of Care: 34    Missed Visits: 0      If an interpreting service is utilized for treatment of this patient, the contents of this document represent the material reviewed with the patient via the .     Reporting Period : 2025 to 25    Goals/Measure of Progress:  Short Term Goals: STG- To be accomplished in 6 week(s):  1.  Pt will be compliant with HEP to encourage prophylaxis.   Eval:provided and reviewed HEP   Current 25: updated and reviewed HEP for STEVEN with dowel wyatt  PN Status (2025): compliance daily with all HEP, MET     2.  Pt will increase left shoulder PROM to 160 deg flexion/ABD, and >60 deg ER/IR to improve mobility for ADLs.  Eval:                                           AROM                                                              PROM    Left Right   Left Right   Flexion  Flexion 92 165   Scaption/ABD  Scaptin/ABD 74 147   ER @ 0 Degrees NT NT ER @ 0 Degrees 20 NT   ER @ 90 Degrees NT 90 ER @ 90 Degrees NT 90   IR @ 90 Degrees NT 40 IR  Degrees @0 = 60 @ 90 = 45     Current 25: Left Shoulder flexion AROM = 148deg     Discharge Status (25): Nearly Met in ER/IR, Progressing in Flexion and Abduction   PROM Left Right   Flexion 150 165   Scaptin/ 147   ER @ 0 Degrees 53 NT   ER @ 90 Degrees 58 90   IR @ 90 Degrees  54 45      Long Term Goals: LTG- To be

## 2025-05-30 ENCOUNTER — APPOINTMENT (OUTPATIENT)
Facility: HOSPITAL | Age: 85
End: 2025-05-30
Payer: MEDICARE